# Patient Record
Sex: FEMALE | Race: WHITE | NOT HISPANIC OR LATINO | Employment: OTHER | URBAN - NONMETROPOLITAN AREA
[De-identification: names, ages, dates, MRNs, and addresses within clinical notes are randomized per-mention and may not be internally consistent; named-entity substitution may affect disease eponyms.]

---

## 2017-02-09 ENCOUNTER — TELEPHONE (OUTPATIENT)
Dept: FAMILY MEDICINE CLINIC | Facility: CLINIC | Age: 67
End: 2017-02-09

## 2017-02-09 RX ORDER — HYDROXYCHLOROQUINE SULFATE 200 MG/1
200 TABLET, FILM COATED ORAL DAILY
Qty: 90 TABLET | Refills: 1 | Status: SHIPPED | OUTPATIENT
Start: 2017-02-09 | End: 2017-07-10 | Stop reason: SDUPTHER

## 2017-02-09 RX ORDER — INDOMETHACIN 25 MG/1
25 CAPSULE ORAL 2 TIMES DAILY
Qty: 180 CAPSULE | Refills: 1 | Status: SHIPPED | OUTPATIENT
Start: 2017-02-09 | End: 2017-07-18 | Stop reason: SDUPTHER

## 2017-02-09 RX ORDER — VENLAFAXINE 75 MG/1
75 TABLET ORAL DAILY
Qty: 90 TABLET | Refills: 1 | Status: SHIPPED | OUTPATIENT
Start: 2017-02-09 | End: 2017-07-10 | Stop reason: SDUPTHER

## 2017-02-09 RX ORDER — SIMVASTATIN 20 MG
20 TABLET ORAL DAILY
Qty: 90 TABLET | Refills: 1 | Status: SHIPPED | OUTPATIENT
Start: 2017-02-09 | End: 2017-07-10 | Stop reason: SDUPTHER

## 2017-02-09 RX ORDER — LISINOPRIL 20 MG/1
20 TABLET ORAL DAILY
Qty: 90 TABLET | Refills: 1 | Status: SHIPPED | OUTPATIENT
Start: 2017-02-09 | End: 2017-07-10 | Stop reason: SDUPTHER

## 2017-07-10 ENCOUNTER — TELEPHONE (OUTPATIENT)
Dept: FAMILY MEDICINE CLINIC | Facility: CLINIC | Age: 67
End: 2017-07-10

## 2017-07-10 RX ORDER — LISINOPRIL 20 MG/1
20 TABLET ORAL DAILY
Qty: 30 TABLET | Refills: 0 | Status: SHIPPED | OUTPATIENT
Start: 2017-07-10 | End: 2017-07-18 | Stop reason: DRUGHIGH

## 2017-07-10 RX ORDER — SIMVASTATIN 20 MG
20 TABLET ORAL DAILY
Qty: 30 TABLET | Refills: 0 | Status: SHIPPED | OUTPATIENT
Start: 2017-07-10 | End: 2017-07-18 | Stop reason: SDUPTHER

## 2017-07-10 RX ORDER — VENLAFAXINE 75 MG/1
75 TABLET ORAL DAILY
Qty: 30 TABLET | Refills: 0 | Status: SHIPPED | OUTPATIENT
Start: 2017-07-10 | End: 2017-07-18 | Stop reason: SDUPTHER

## 2017-07-10 RX ORDER — HYDROXYCHLOROQUINE SULFATE 200 MG/1
200 TABLET, FILM COATED ORAL DAILY
Qty: 30 TABLET | Refills: 0 | Status: SHIPPED | OUTPATIENT
Start: 2017-07-10 | End: 2017-07-18

## 2017-07-10 NOTE — TELEPHONE ENCOUNTER
Patient needs refills.    Lisinopril 20 mg    Simvastatin 20 mg    Venlafaxine 75 mg    She has no RX insurance.  Hydroxychloroquine 200 mg is too expensive. Can you prescribe something cheaper?      [facility].

## 2017-07-18 ENCOUNTER — OFFICE VISIT (OUTPATIENT)
Dept: FAMILY MEDICINE CLINIC | Facility: CLINIC | Age: 67
End: 2017-07-18

## 2017-07-18 VITALS
DIASTOLIC BLOOD PRESSURE: 100 MMHG | SYSTOLIC BLOOD PRESSURE: 160 MMHG | OXYGEN SATURATION: 98 % | WEIGHT: 167 LBS | HEART RATE: 87 BPM | TEMPERATURE: 98.4 F | HEIGHT: 66 IN | BODY MASS INDEX: 26.84 KG/M2

## 2017-07-18 DIAGNOSIS — M06.9 RHEUMATOID ARTHRITIS INVOLVING BOTH HANDS, UNSPECIFIED RHEUMATOID FACTOR PRESENCE: ICD-10-CM

## 2017-07-18 DIAGNOSIS — G25.81 RESTLESS LEGS SYNDROME: ICD-10-CM

## 2017-07-18 DIAGNOSIS — I10 ESSENTIAL HYPERTENSION: Primary | ICD-10-CM

## 2017-07-18 PROCEDURE — 99214 OFFICE O/P EST MOD 30 MIN: CPT | Performed by: FAMILY MEDICINE

## 2017-07-18 PROCEDURE — 80048 BASIC METABOLIC PNL TOTAL CA: CPT | Performed by: FAMILY MEDICINE

## 2017-07-18 PROCEDURE — 36415 COLL VENOUS BLD VENIPUNCTURE: CPT | Performed by: FAMILY MEDICINE

## 2017-07-18 RX ORDER — VENLAFAXINE 75 MG/1
75 TABLET ORAL DAILY
Qty: 30 TABLET | Refills: 11 | Status: SHIPPED | OUTPATIENT
Start: 2017-07-18 | End: 2018-07-08 | Stop reason: ALTCHOICE

## 2017-07-18 RX ORDER — FERROUS SULFATE 325(65) MG
325 TABLET ORAL 2 TIMES DAILY
Qty: 100 TABLET | Refills: 11 | Status: SHIPPED | OUTPATIENT
Start: 2017-07-18 | End: 2022-05-05

## 2017-07-18 RX ORDER — IBUPROFEN 800 MG/1
TABLET ORAL
COMMUNITY
Start: 2015-07-15 | End: 2017-07-18

## 2017-07-18 RX ORDER — LISINOPRIL 40 MG/1
40 TABLET ORAL DAILY
Qty: 30 TABLET | Refills: 0 | Status: SHIPPED | OUTPATIENT
Start: 2017-07-18 | End: 2017-08-02 | Stop reason: SDUPTHER

## 2017-07-18 RX ORDER — HYDROXYCHLOROQUINE SULFATE 200 MG/1
200 TABLET, FILM COATED ORAL DAILY
Qty: 90 TABLET | Refills: 3 | Status: SHIPPED | OUTPATIENT
Start: 2017-07-18 | End: 2018-07-31 | Stop reason: SDUPTHER

## 2017-07-18 RX ORDER — SIMVASTATIN 20 MG
20 TABLET ORAL DAILY
Qty: 30 TABLET | Refills: 11 | Status: SHIPPED | OUTPATIENT
Start: 2017-07-18 | End: 2018-09-09 | Stop reason: SDUPTHER

## 2017-07-18 RX ORDER — INDOMETHACIN 25 MG/1
25 CAPSULE ORAL 2 TIMES DAILY
Qty: 180 CAPSULE | Refills: 1 | Status: SHIPPED | OUTPATIENT
Start: 2017-07-18 | End: 2019-08-27

## 2017-07-18 NOTE — PROGRESS NOTES
Giselle Herrera is a 66 y.o. female.     History of Present Illness     bp rechecked and still high.  Stopped plaquenil.  Over $100 monthly.  Still jumpy legs.  Ferritin was 31  Has taken effexor for years    Review of Systems   Constitutional: Negative for chills, fatigue and fever.   HENT: Negative for congestion, ear discharge, ear pain, facial swelling, hearing loss, postnasal drip, rhinorrhea, sinus pressure, sore throat, trouble swallowing and voice change.    Eyes: Negative for discharge, redness and visual disturbance.   Respiratory: Negative for cough, chest tightness, shortness of breath and wheezing.    Cardiovascular: Negative for chest pain and palpitations.   Gastrointestinal: Negative for abdominal pain, blood in stool, constipation, diarrhea, nausea and vomiting.   Endocrine: Negative for polydipsia and polyuria.   Genitourinary: Negative for dysuria, flank pain, hematuria and urgency.   Musculoskeletal: Negative for arthralgias, back pain, joint swelling and myalgias.   Skin: Negative for rash.   Neurological: Negative for dizziness, weakness, numbness and headaches.   Hematological: Negative for adenopathy.   Psychiatric/Behavioral: Negative for confusion and sleep disturbance. The patient is not nervous/anxious.        Objective   Physical Exam   Constitutional: She is oriented to person, place, and time. She appears well-developed and well-nourished.   HENT:   Head: Normocephalic and atraumatic.   Right Ear: External ear normal.   Left Ear: External ear normal.   Nose: Nose normal.   Eyes: Conjunctivae and EOM are normal. Pupils are equal, round, and reactive to light.   Neck: Normal range of motion. Neck supple.   Cardiovascular: Normal rate, regular rhythm and normal heart sounds.  Exam reveals no gallop and no friction rub.    No murmur heard.  Pulmonary/Chest: Effort normal and breath sounds normal.   Abdominal: Soft. Bowel sounds are normal. She exhibits no distension. There  is no tenderness. There is no rebound and no guarding.   Musculoskeletal: Normal range of motion. She exhibits no edema or deformity.   Neurological: She is alert and oriented to person, place, and time. No cranial nerve deficit.   Skin: Skin is warm and dry. No rash noted. No erythema.   Psychiatric: She has a normal mood and affect. Her behavior is normal. Judgment and thought content normal.   Nursing note and vitals reviewed.      Assessment/Plan   Lindsay was seen today for follow-up and med refill.    Diagnoses and all orders for this visit:    Essential hypertension  -     Basic Metabolic Panel    Rheumatoid arthritis involving both hands, unspecified rheumatoid factor presence    Restless legs syndrome    Other orders  -     hydroxychloroquine (PLAQUENIL) 200 MG tablet; Take 1 tablet by mouth Daily.  -     lisinopril (PRINIVIL,ZESTRIL) 40 MG tablet; Take 1 tablet by mouth Daily.  -     venlafaxine (EFFEXOR) 75 MG tablet; Take 1 tablet by mouth Daily. For acute reaction to stress.  -     simvastatin (ZOCOR) 20 MG tablet; Take 1 tablet by mouth Daily. For cholesterol  -     indomethacin (INDOCIN) 25 MG capsule; Take 1 capsule by mouth 2 (Two) Times a Day. For arthritis  -     ferrous sulfate 325 (65 FE) MG tablet; Take 1 tablet by mouth 2 (Two) Times a Day.      Refilled meds  May try calling other pharmacies for prices on plaquenil.  May try [facility]  Increase lisinopril to 40mg qd  Return 2 weeks bp check  Avoid diuretics since was on allopurinol at one time, she doesn't believe ever told she had gout.  Check renal function today    Oral iron bid may help restless legs

## 2017-07-19 LAB
ANION GAP SERPL CALCULATED.3IONS-SCNC: 13 MMOL/L (ref 5–15)
BUN BLD-MCNC: 11 MG/DL (ref 7–21)
BUN/CREAT SERPL: 14.3 (ref 7–25)
CALCIUM SPEC-SCNC: 9.7 MG/DL (ref 8.4–10.2)
CHLORIDE SERPL-SCNC: 104 MMOL/L (ref 95–110)
CO2 SERPL-SCNC: 24 MMOL/L (ref 22–31)
CREAT BLD-MCNC: 0.77 MG/DL (ref 0.5–1)
GFR SERPL CREATININE-BSD FRML MDRD: 75 ML/MIN/1.73 (ref 60–104)
GLUCOSE BLD-MCNC: 79 MG/DL (ref 60–100)
POTASSIUM BLD-SCNC: 4.9 MMOL/L (ref 3.5–5.1)
SODIUM BLD-SCNC: 141 MMOL/L (ref 137–145)

## 2017-08-02 ENCOUNTER — OFFICE VISIT (OUTPATIENT)
Dept: FAMILY MEDICINE CLINIC | Facility: CLINIC | Age: 67
End: 2017-08-02

## 2017-08-02 VITALS
HEIGHT: 66 IN | SYSTOLIC BLOOD PRESSURE: 148 MMHG | WEIGHT: 165.6 LBS | OXYGEN SATURATION: 98 % | TEMPERATURE: 99.9 F | BODY MASS INDEX: 26.61 KG/M2 | DIASTOLIC BLOOD PRESSURE: 80 MMHG | HEART RATE: 79 BPM

## 2017-08-02 DIAGNOSIS — I10 ESSENTIAL HYPERTENSION: Primary | ICD-10-CM

## 2017-08-02 PROCEDURE — 99213 OFFICE O/P EST LOW 20 MIN: CPT | Performed by: FAMILY MEDICINE

## 2017-08-02 RX ORDER — LISINOPRIL 40 MG/1
60 TABLET ORAL DAILY
Qty: 45 TABLET | Refills: 0 | Status: SHIPPED | OUTPATIENT
Start: 2017-08-02 | End: 2017-08-22 | Stop reason: SDUPTHER

## 2017-08-02 NOTE — PROGRESS NOTES
Subjective   Lindsay Herrera is a 66 y.o. female.     History of Present Illness     Getting plaquenil from [place]   $100 cheaper  bp much better 40mg lisinopril qd.    Review of Systems   Constitutional: Negative for chills, fatigue and fever.   HENT: Negative for congestion, ear discharge, ear pain, facial swelling, hearing loss, postnasal drip, rhinorrhea, sinus pressure, sore throat, trouble swallowing and voice change.    Eyes: Negative for discharge, redness and visual disturbance.   Respiratory: Negative for cough, chest tightness, shortness of breath and wheezing.    Cardiovascular: Negative for chest pain and palpitations.   Gastrointestinal: Negative for abdominal pain, blood in stool, constipation, diarrhea, nausea and vomiting.   Endocrine: Negative for polydipsia and polyuria.   Genitourinary: Negative for dysuria, flank pain, hematuria and urgency.   Musculoskeletal: Negative for arthralgias, back pain, joint swelling and myalgias.   Skin: Negative for rash.   Neurological: Negative for dizziness, weakness, numbness and headaches.   Hematological: Negative for adenopathy.   Psychiatric/Behavioral: Negative for confusion and sleep disturbance. The patient is not nervous/anxious.        Objective   Physical Exam   Constitutional: She is oriented to person, place, and time. She appears well-developed and well-nourished.   HENT:   Head: Normocephalic and atraumatic.   Right Ear: External ear normal.   Left Ear: External ear normal.   Nose: Nose normal.   Eyes: Conjunctivae and EOM are normal. Pupils are equal, round, and reactive to light.   Neck: Normal range of motion.   Pulmonary/Chest: Effort normal.   Musculoskeletal: Normal range of motion.   Neurological: She is alert and oriented to person, place, and time.   Psychiatric: She has a normal mood and affect. Her behavior is normal. Judgment and thought content normal.   Nursing note and vitals reviewed.      Assessment/Plan   Lindsay was seen today  for follow-up.    Diagnoses and all orders for this visit:    Essential hypertension    Other orders  -     lisinopril (PRINIVIL,ZESTRIL) 40 MG tablet; Take 1.5 tablets by mouth Daily.      She will take 40mg am and 20mg pm.   2 wks bp check

## 2017-08-22 ENCOUNTER — TELEPHONE (OUTPATIENT)
Dept: FAMILY MEDICINE CLINIC | Facility: CLINIC | Age: 67
End: 2017-08-22

## 2017-08-22 RX ORDER — LISINOPRIL 40 MG/1
60 TABLET ORAL DAILY
Qty: 45 TABLET | Refills: 11 | Status: SHIPPED | OUTPATIENT
Start: 2017-08-22 | End: 2017-09-21

## 2017-08-22 NOTE — TELEPHONE ENCOUNTER
PATIENT CAME IN FOR REPEAT BLOOD PRESSURE CHECK. BLOOD PRESSURE /70. ALSO NEEDS A REFILL OF HER LISINOPRIL 60MG 1 DAILY #90 TO [facility]

## 2017-09-21 ENCOUNTER — TELEPHONE (OUTPATIENT)
Dept: FAMILY MEDICINE CLINIC | Facility: CLINIC | Age: 67
End: 2017-09-21

## 2017-09-21 ENCOUNTER — OFFICE VISIT (OUTPATIENT)
Dept: FAMILY MEDICINE CLINIC | Facility: CLINIC | Age: 67
End: 2017-09-21

## 2017-09-21 VITALS
HEIGHT: 66 IN | WEIGHT: 166.8 LBS | OXYGEN SATURATION: 98 % | DIASTOLIC BLOOD PRESSURE: 102 MMHG | SYSTOLIC BLOOD PRESSURE: 154 MMHG | BODY MASS INDEX: 26.81 KG/M2 | HEART RATE: 78 BPM | TEMPERATURE: 97.9 F

## 2017-09-21 DIAGNOSIS — I10 ESSENTIAL HYPERTENSION: Primary | ICD-10-CM

## 2017-09-21 DIAGNOSIS — M06.9 RHEUMATOID ARTHRITIS INVOLVING BOTH HANDS, UNSPECIFIED RHEUMATOID FACTOR PRESENCE: ICD-10-CM

## 2017-09-21 PROCEDURE — 99213 OFFICE O/P EST LOW 20 MIN: CPT | Performed by: FAMILY MEDICINE

## 2017-09-21 RX ORDER — AMLODIPINE BESYLATE 5 MG/1
5 TABLET ORAL DAILY
Qty: 30 TABLET | Refills: 0 | Status: SHIPPED | OUTPATIENT
Start: 2017-09-21 | End: 2017-10-16 | Stop reason: SDUPTHER

## 2017-09-21 RX ORDER — LISINOPRIL 40 MG/1
40 TABLET ORAL DAILY
Qty: 30 TABLET | Refills: 11 | Status: SHIPPED | OUTPATIENT
Start: 2017-09-21 | End: 2018-07-03 | Stop reason: ALTCHOICE

## 2017-09-21 NOTE — TELEPHONE ENCOUNTER
----- Message from Georgie Billings MA sent at 9/21/2017 11:59 AM CDT -----  Regarding: FW: Non-Urgent Medical Question  Contact: [phone]      ----- Message -----     From: Lindsay Herrera     Sent: 9/21/2017  10:49 AM       To: Desire Tran [facility]  Subject: Non-Urgent Medical Question                      i been on 60mg blood pressure  tuesday morning it was 142/94  that nite 184/96  wed morn 147/68  wed nite 161/79  you think i should which to the 100 mg you was talking about?

## 2017-09-21 NOTE — PROGRESS NOTES
Subjective   Lindsay Herrera is a 66 y.o. female.     History of Present Illness     Has not been taking her meds with food.  Upsetting stomach  [place]  bp not under good control.  Taking lisinopril 40mg am and 20mg pm.  Her blood pressure cuff is not accurate, she brought it today to check.    Review of Systems   Constitutional: Negative for chills, fatigue and fever.   HENT: Negative for congestion, ear discharge, ear pain, facial swelling, hearing loss, postnasal drip, rhinorrhea, sinus pressure, sore throat, trouble swallowing and voice change.    Eyes: Negative for discharge, redness and visual disturbance.   Respiratory: Negative for cough, chest tightness, shortness of breath and wheezing.    Cardiovascular: Negative for chest pain and palpitations.   Gastrointestinal: Negative for abdominal pain, blood in stool, constipation, diarrhea, nausea and vomiting.   Endocrine: Negative for polydipsia and polyuria.   Genitourinary: Negative for dysuria, flank pain, hematuria and urgency.   Musculoskeletal: Negative for arthralgias, back pain, joint swelling and myalgias.   Skin: Negative for rash.   Neurological: Negative for dizziness, weakness, numbness and headaches.   Hematological: Negative for adenopathy.   Psychiatric/Behavioral: Negative for confusion and sleep disturbance. The patient is not nervous/anxious.        Objective   Physical Exam   Constitutional: She is oriented to person, place, and time. She appears well-developed and well-nourished.   HENT:   Head: Normocephalic and atraumatic.   Right Ear: External ear normal.   Left Ear: External ear normal.   Nose: Nose normal.   Eyes: Conjunctivae and EOM are normal. Pupils are equal, round, and reactive to light.   Neck: Normal range of motion.   Pulmonary/Chest: Effort normal.   Musculoskeletal: Normal range of motion.   Neurological: She is alert and oriented to person, place, and time.   Psychiatric:  She has a normal mood and affect. Her behavior is normal. Judgment and thought content normal.   Nursing note and vitals reviewed.      Assessment/Plan   Lindsay was seen today for hypertension.    Diagnoses and all orders for this visit:    Essential hypertension    Rheumatoid arthritis involving both hands, unspecified rheumatoid factor presence    Other orders  -     lisinopril (PRINIVIL,ZESTRIL) 40 MG tablet; Take 1 tablet by mouth Daily.  -     amLODIPine (NORVASC) 5 MG tablet; Take 1 tablet by mouth Daily.    go back to 40mg qd lisinopril  Add norvasc 5mg  Recheck bp 1-2 weeks, will need another bp monitor for home  Take meds with food!

## 2017-10-16 RX ORDER — AMLODIPINE BESYLATE 5 MG/1
5 TABLET ORAL DAILY
Qty: 30 TABLET | Refills: 0 | Status: SHIPPED | OUTPATIENT
Start: 2017-10-16 | End: 2017-10-18 | Stop reason: SDUPTHER

## 2017-10-17 ENCOUNTER — CLINICAL SUPPORT (OUTPATIENT)
Dept: FAMILY MEDICINE CLINIC | Facility: CLINIC | Age: 67
End: 2017-10-17

## 2017-10-17 VITALS — SYSTOLIC BLOOD PRESSURE: 124 MMHG | DIASTOLIC BLOOD PRESSURE: 70 MMHG

## 2017-10-17 PROCEDURE — 90686 IIV4 VACC NO PRSV 0.5 ML IM: CPT | Performed by: FAMILY MEDICINE

## 2017-10-17 PROCEDURE — G0008 ADMIN INFLUENZA VIRUS VAC: HCPCS | Performed by: FAMILY MEDICINE

## 2017-10-18 RX ORDER — AMLODIPINE BESYLATE 5 MG/1
5 TABLET ORAL DAILY
Qty: 90 TABLET | Refills: 3 | Status: SHIPPED | OUTPATIENT
Start: 2017-10-18 | End: 2018-07-03 | Stop reason: ALTCHOICE

## 2018-07-03 ENCOUNTER — OFFICE VISIT (OUTPATIENT)
Dept: FAMILY MEDICINE CLINIC | Facility: CLINIC | Age: 68
End: 2018-07-03

## 2018-07-03 VITALS
HEIGHT: 66 IN | BODY MASS INDEX: 25.04 KG/M2 | OXYGEN SATURATION: 97 % | TEMPERATURE: 99.6 F | WEIGHT: 155.8 LBS | SYSTOLIC BLOOD PRESSURE: 150 MMHG | DIASTOLIC BLOOD PRESSURE: 98 MMHG | HEART RATE: 74 BPM

## 2018-07-03 DIAGNOSIS — F32.A DEPRESSION, UNSPECIFIED DEPRESSION TYPE: ICD-10-CM

## 2018-07-03 DIAGNOSIS — I10 ESSENTIAL HYPERTENSION: Primary | ICD-10-CM

## 2018-07-03 DIAGNOSIS — F41.9 ANXIETY: ICD-10-CM

## 2018-07-03 PROCEDURE — 99214 OFFICE O/P EST MOD 30 MIN: CPT | Performed by: FAMILY MEDICINE

## 2018-07-03 RX ORDER — VERAPAMIL HYDROCHLORIDE 80 MG/1
80 TABLET ORAL 3 TIMES DAILY
Qty: 90 TABLET | Refills: 0 | Status: SHIPPED | OUTPATIENT
Start: 2018-07-03 | End: 2018-07-23 | Stop reason: SDUPTHER

## 2018-07-03 RX ORDER — BENAZEPRIL HYDROCHLORIDE 40 MG/1
40 TABLET, FILM COATED ORAL DAILY
Qty: 30 TABLET | Refills: 0 | Status: SHIPPED | OUTPATIENT
Start: 2018-07-03 | End: 2018-07-23 | Stop reason: SDUPTHER

## 2018-07-03 NOTE — PROGRESS NOTES
" Giselle Herrera is a 67 y.o. female.     History of Present Illness     Felt bad on norvasc and effexor.  She stopped antidepressant and is taking the norvasc every other day because expensive ($60)  She is crying for no reason and bp not controlled.   Wants me to prescribe from $4 list at [facility]    Review of Systems   Constitutional: Negative for chills, fatigue and fever.   HENT: Negative for congestion, ear discharge, ear pain, facial swelling, hearing loss, postnasal drip, rhinorrhea, sinus pressure, sore throat, trouble swallowing and voice change.    Eyes: Negative for discharge, redness and visual disturbance.   Respiratory: Negative for cough, chest tightness, shortness of breath and wheezing.    Cardiovascular: Negative for chest pain and palpitations.   Gastrointestinal: Negative for abdominal pain, blood in stool, constipation, diarrhea, nausea and vomiting.   Endocrine: Negative for polydipsia and polyuria.   Genitourinary: Negative for dysuria, flank pain, hematuria and urgency.   Musculoskeletal: Negative for arthralgias, back pain, joint swelling and myalgias.   Skin: Negative for rash.   Neurological: Negative for dizziness, weakness, numbness and headaches.   Hematological: Negative for adenopathy.   Psychiatric/Behavioral: Positive for dysphoric mood. Negative for confusion and sleep disturbance. The patient is not nervous/anxious.            /98 (BP Location: Left arm, Patient Position: Sitting, Cuff Size: Adult)   Pulse 74   Temp 99.6 °F (37.6 °C) (Temporal Artery )   Ht 167.6 cm (65.98\")   Wt 70.7 kg (155 lb 12.8 oz)   SpO2 97%   BMI 25.16 kg/m²       Objective     Physical Exam   Constitutional: She is oriented to person, place, and time. She appears well-developed and well-nourished.   HENT:   Head: Normocephalic and atraumatic.   Right Ear: External ear normal.   Left Ear: External ear normal.   Nose: Nose normal.   Eyes: Conjunctivae and EOM are normal. Pupils are " equal, round, and reactive to light.   Neck: Normal range of motion. Neck supple.   Cardiovascular: Normal rate, regular rhythm and normal heart sounds.  Exam reveals no gallop and no friction rub.    No murmur heard.  Pulmonary/Chest: Effort normal and breath sounds normal.   Abdominal: She exhibits no distension. There is no tenderness. There is no rebound and no guarding.   Musculoskeletal: Normal range of motion. She exhibits no edema or deformity.   Neurological: She is alert and oriented to person, place, and time. No cranial nerve deficit.   Skin: Skin is warm and dry. No rash noted. No erythema.   Psychiatric: She has a normal mood and affect. Her behavior is normal. Judgment and thought content normal.   Nursing note and vitals reviewed.          PAST MEDICAL HISTORY     Past Medical History:   Diagnosis Date   • Artificial lens present     Artificial lens in position - both     • Nuclear senile cataract of left eye    • Visual discomfort     Visual discomfort - R>L       PAST SURGICAL HISTORY     Past Surgical History:   Procedure Laterality Date   • CATARACT EXTRACTION Left 07/28/2015    Remove cataract, insert lens (2) - left eye   • OTHER SURGICAL HISTORY  07/21/2015    OPTICAL BIOMETRY 39129 (2) - AMARILIS Dubon      SOCIAL HISTORY     Social History     Social History   • Marital status:      Social History Main Topics   • Smoking status: Former Smoker     Types: Cigarettes     Quit date: 11/22/2013   • Smokeless tobacco: Never Used      Comment: Current Smoker   • Alcohol use No   • Drug use: No   • Sexual activity: Not Currently     Other Topics Concern   • Not on file      ALLERGIES   Patient has no known allergies.   MEDICATIONS     Current Outpatient Prescriptions   Medication Sig Dispense Refill   • ferrous sulfate 325 (65 FE) MG tablet Take 1 tablet by mouth 2 (Two) Times a Day. 100 tablet 11   • hydroxychloroquine (PLAQUENIL) 200 MG tablet Take 1 tablet by mouth Daily. 90 tablet 3   •  indomethacin (INDOCIN) 25 MG capsule Take 1 capsule by mouth 2 (Two) Times a Day. For arthritis 180 capsule 1   • simvastatin (ZOCOR) 20 MG tablet Take 1 tablet by mouth Daily. For cholesterol 30 tablet 11   • benazepril (LOTENSIN) 40 MG tablet Take 1 tablet by mouth Daily. 30 tablet 0   • venlafaxine (EFFEXOR) 75 MG tablet Take 1 tablet by mouth Daily. For acute reaction to stress. 30 tablet 11   • verapamil (CALAN) 80 MG tablet Take 1 tablet by mouth 3 (Three) Times a Day. 90 tablet 0     No current facility-administered medications for this visit.         The following portions of the patient's history were reviewed and updated as appropriate: allergies, current medications, past family history, past medical history, past social history, past surgical history and problem list.        Assessment/Plan   Lindsay was seen today for depression.    Diagnoses and all orders for this visit:    Essential hypertension    Anxiety    Depression, unspecified depression type    Other orders  -     benazepril (LOTENSIN) 40 MG tablet; Take 1 tablet by mouth Daily.  -     verapamil (CALAN) 80 MG tablet; Take 1 tablet by mouth 3 (Three) Times a Day.      Changed lisinopril to benazepril on $4 list.  Changed norvasc to verapamil 80mg tid $4 list  Resume effexor 75mg qam, has worked in past for her   Return after 7/18/17 so I can get labs and will be covered by medicare                 No Follow-up on file.                  This document has been electronically signed by Bill Herrera MD on July 3, 2018 3:15 PM

## 2018-07-08 RX ORDER — FLUOXETINE HYDROCHLORIDE 20 MG/1
20 CAPSULE ORAL DAILY
Qty: 90 CAPSULE | Refills: 3 | Status: SHIPPED | OUTPATIENT
Start: 2018-07-08 | End: 2019-03-08 | Stop reason: SDUPTHER

## 2018-07-23 ENCOUNTER — TELEPHONE (OUTPATIENT)
Dept: FAMILY MEDICINE CLINIC | Facility: CLINIC | Age: 68
End: 2018-07-23

## 2018-07-23 ENCOUNTER — LAB (OUTPATIENT)
Dept: LAB | Facility: CLINIC | Age: 68
End: 2018-07-23

## 2018-07-23 DIAGNOSIS — I10 ESSENTIAL HYPERTENSION: ICD-10-CM

## 2018-07-23 DIAGNOSIS — I10 ESSENTIAL HYPERTENSION: Primary | ICD-10-CM

## 2018-07-23 PROCEDURE — 80061 LIPID PANEL: CPT | Performed by: FAMILY MEDICINE

## 2018-07-23 PROCEDURE — 85027 COMPLETE CBC AUTOMATED: CPT | Performed by: FAMILY MEDICINE

## 2018-07-23 PROCEDURE — 84443 ASSAY THYROID STIM HORMONE: CPT | Performed by: FAMILY MEDICINE

## 2018-07-23 PROCEDURE — 36415 COLL VENOUS BLD VENIPUNCTURE: CPT | Performed by: FAMILY MEDICINE

## 2018-07-23 PROCEDURE — 80053 COMPREHEN METABOLIC PANEL: CPT | Performed by: FAMILY MEDICINE

## 2018-07-23 RX ORDER — BENAZEPRIL HYDROCHLORIDE 40 MG/1
40 TABLET, FILM COATED ORAL DAILY
Qty: 30 TABLET | Refills: 11 | Status: SHIPPED | OUTPATIENT
Start: 2018-07-23 | End: 2019-08-22 | Stop reason: SDUPTHER

## 2018-07-23 RX ORDER — VERAPAMIL HYDROCHLORIDE 80 MG/1
80 TABLET ORAL 3 TIMES DAILY
Qty: 90 TABLET | Refills: 11 | Status: SHIPPED | OUTPATIENT
Start: 2018-07-23 | End: 2019-08-22 | Stop reason: SDUPTHER

## 2018-07-23 RX ORDER — HYDROCHLOROTHIAZIDE 12.5 MG/1
12.5 CAPSULE, GELATIN COATED ORAL DAILY
Qty: 30 CAPSULE | Refills: 11 | Status: SHIPPED | OUTPATIENT
Start: 2018-07-23 | End: 2019-08-22 | Stop reason: SDUPTHER

## 2018-07-23 NOTE — TELEPHONE ENCOUNTER
Patient came in for a bp check-- her bp was 142/86. 69 heart rate. Dr Herrera called her some HCTZ 12.  5 mg 1 daily to [facility]

## 2018-07-24 LAB
ALBUMIN SERPL-MCNC: 4.3 G/DL (ref 3.4–4.8)
ALBUMIN/GLOB SERPL: 1.8 G/DL (ref 1.1–1.8)
ALP SERPL-CCNC: 59 U/L (ref 38–126)
ALT SERPL W P-5'-P-CCNC: 23 U/L (ref 9–52)
ANION GAP SERPL CALCULATED.3IONS-SCNC: 9 MMOL/L (ref 5–15)
ARTICHOKE IGE QN: 84 MG/DL (ref 1–129)
AST SERPL-CCNC: 17 U/L (ref 14–36)
BILIRUB SERPL-MCNC: 0.8 MG/DL (ref 0.2–1.3)
BUN BLD-MCNC: 9 MG/DL (ref 7–21)
BUN/CREAT SERPL: 10 (ref 7–25)
CALCIUM SPEC-SCNC: 9.4 MG/DL (ref 8.4–10.2)
CHLORIDE SERPL-SCNC: 108 MMOL/L (ref 95–110)
CHOLEST SERPL-MCNC: 170 MG/DL (ref 0–199)
CO2 SERPL-SCNC: 23 MMOL/L (ref 22–31)
CREAT BLD-MCNC: 0.9 MG/DL (ref 0.5–1)
DEPRECATED RDW RBC AUTO: 45 FL (ref 36.4–46.3)
ERYTHROCYTE [DISTWIDTH] IN BLOOD BY AUTOMATED COUNT: 13.3 % (ref 11.5–14.5)
GFR SERPL CREATININE-BSD FRML MDRD: 63 ML/MIN/1.73 (ref 45–104)
GLOBULIN UR ELPH-MCNC: 2.4 GM/DL (ref 2.3–3.5)
GLUCOSE BLD-MCNC: 101 MG/DL (ref 60–100)
HCT VFR BLD AUTO: 40.6 % (ref 35–45)
HDLC SERPL-MCNC: 53 MG/DL (ref 60–200)
HGB BLD-MCNC: 13.5 G/DL (ref 12–15.5)
LDLC/HDLC SERPL: 1.73 {RATIO} (ref 0–3.22)
MCH RBC QN AUTO: 30.8 PG (ref 26.5–34)
MCHC RBC AUTO-ENTMCNC: 33.3 G/DL (ref 31.4–36)
MCV RBC AUTO: 92.5 FL (ref 80–98)
PLATELET # BLD AUTO: 192 10*3/MM3 (ref 150–450)
PMV BLD AUTO: 13.7 FL (ref 8–12)
POTASSIUM BLD-SCNC: 4.3 MMOL/L (ref 3.5–5.1)
PROT SERPL-MCNC: 6.7 G/DL (ref 6.3–8.6)
RBC # BLD AUTO: 4.39 10*6/MM3 (ref 3.77–5.16)
SODIUM BLD-SCNC: 140 MMOL/L (ref 137–145)
TRIGL SERPL-MCNC: 126 MG/DL (ref 20–199)
TSH SERPL DL<=0.05 MIU/L-ACNC: 1.71 MIU/ML (ref 0.46–4.68)
WBC NRBC COR # BLD: 3.96 10*3/MM3 (ref 3.2–9.8)

## 2018-07-31 ENCOUNTER — TELEPHONE (OUTPATIENT)
Dept: FAMILY MEDICINE CLINIC | Facility: CLINIC | Age: 68
End: 2018-07-31

## 2018-07-31 RX ORDER — HYDROXYCHLOROQUINE SULFATE 200 MG/1
200 TABLET, FILM COATED ORAL DAILY
Qty: 100 TABLET | Refills: 3 | Status: SHIPPED | OUTPATIENT
Start: 2018-07-31 | End: 2019-08-22 | Stop reason: SDUPTHER

## 2018-07-31 NOTE — TELEPHONE ENCOUNTER
----- Message from Georgie Billings MA sent at 2018 11:22 AM CDT -----  Regarding: FW: Prescription Question  Contact: [phone]      ----- Message -----  From: Lindsay Herrera  Sent: 2018  11:04 AM  To: Desire Tran [facility]  Subject: Prescription Question                            my  hydroxychloroquine 200 MG tablet  Commonly known as: PLAQUENIL  has   if you can fax a new perscription to   its the [facility] pharmacy in [place] and i get 100 not 90 thats how they send them ty

## 2018-09-10 RX ORDER — SIMVASTATIN 20 MG
TABLET ORAL
Qty: 30 TABLET | Refills: 11 | Status: SHIPPED | OUTPATIENT
Start: 2018-09-10 | End: 2019-08-22 | Stop reason: SDUPTHER

## 2018-10-30 ENCOUNTER — FLU SHOT (OUTPATIENT)
Dept: FAMILY MEDICINE CLINIC | Facility: CLINIC | Age: 68
End: 2018-10-30

## 2018-10-30 DIAGNOSIS — Z23 FLU VACCINE NEED: ICD-10-CM

## 2018-10-30 PROCEDURE — G0008 ADMIN INFLUENZA VIRUS VAC: HCPCS | Performed by: FAMILY MEDICINE

## 2018-10-30 PROCEDURE — 90674 CCIIV4 VAC NO PRSV 0.5 ML IM: CPT | Performed by: FAMILY MEDICINE

## 2019-03-09 RX ORDER — FLUOXETINE HYDROCHLORIDE 20 MG/1
CAPSULE ORAL
Qty: 90 CAPSULE | Refills: 3 | Status: SHIPPED | OUTPATIENT
Start: 2019-03-09 | End: 2019-08-22 | Stop reason: SDUPTHER

## 2019-04-01 ENCOUNTER — OFFICE VISIT (OUTPATIENT)
Dept: FAMILY MEDICINE CLINIC | Facility: CLINIC | Age: 69
End: 2019-04-01

## 2019-04-01 VITALS
DIASTOLIC BLOOD PRESSURE: 100 MMHG | WEIGHT: 170.2 LBS | OXYGEN SATURATION: 97 % | HEART RATE: 97 BPM | BODY MASS INDEX: 27.35 KG/M2 | TEMPERATURE: 98.6 F | SYSTOLIC BLOOD PRESSURE: 160 MMHG | HEIGHT: 66 IN

## 2019-04-01 DIAGNOSIS — R22.0 BUCCAL MASS: Primary | ICD-10-CM

## 2019-04-01 PROCEDURE — 99213 OFFICE O/P EST LOW 20 MIN: CPT | Performed by: NURSE PRACTITIONER

## 2019-04-01 NOTE — PROGRESS NOTES
Giselle Herrera is a 68 y.o. female. Bump inside of cheek.    History of Present Illness Noticed bump inside of left cheek on Thursday. Is worried because she feels it came about abruptly. Denies any pain, tingling, burning over bump. Denies noticing any drainage from bump. Claims bump has stayed about the same size since Thursday. Says she thought it may be from biting the inside of her mouth because she doesn't remember doing that. Admits to chewing gum a lot recently without wearing her dentures. Feels that her dentures fit properly. Denies cigarette smoking or chewing tobacco.     The following portions of the patient's history were reviewed and updated as appropriate: allergies, current medications, past family history, past medical history, past social history, past surgical history and problem list.    Review of Systems   Constitutional: Negative for appetite change, chills, fatigue and fever.   HENT: Negative for dental problem, drooling, sore throat and trouble swallowing.    Respiratory: Negative.    Cardiovascular: Negative.    Skin: Positive for skin lesions (buccal mass (left side)).   Hematological: Negative.        Objective   Physical Exam   Constitutional: She is oriented to person, place, and time. She appears well-developed and well-nourished. No distress.   Asked about BP, she has not taken her BP meds today. Plans to take them when she gets home.   HENT:   Head: Normocephalic.   Mouth/Throat: Uvula is midline, oropharynx is clear and moist and mucous membranes are normal. She has dentures. Oral lesions present. Tonsils are 1+ on the right. Tonsils are 1+ on the left. No tonsillar exudate.   Buccal mass: pink in color, round, around 1cm in diameter. Mass is intact, no swelling, erythema, or drainage noted. Feels soft to touch, no pain upon palpation of mass.    Eyes: Conjunctivae, EOM and lids are normal. Pupils are equal, round, and reactive to light.   Neck: Normal range of  motion. Neck supple. No thyromegaly present.   Cardiovascular: Normal rate and regular rhythm.   Pulmonary/Chest: Effort normal and breath sounds normal.   Lymphadenopathy:     She has no cervical adenopathy.   Neurological: She is alert and oriented to person, place, and time.   Skin: Skin is warm, dry and intact. She is not diaphoretic.   Psychiatric: She has a normal mood and affect. Her speech is normal and behavior is normal. Judgment and thought content normal. Cognition and memory are normal.         Assessment/Plan   Lindsay was seen today for mouth lesions.    Diagnoses and all orders for this visit:    Buccal mass  -     Ambulatory Referral to ENT (Otolaryngology)      Buccal mass about 1cm in diameter. Appears that it is probably from trauma but due to size felt it needed further evaluation with ENT. Discussed that the mass may be trauma related from biting the inside of her cheek, if it goes away she doesn't have to see ENT. If symptoms do not improve or worsen, patient instructed to return to the clinic for further evaluation.         This document has been electronically signed by MARANDA Dailey on  April 1, 2019 2:09 PM

## 2019-08-22 RX ORDER — FLUOXETINE HYDROCHLORIDE 20 MG/1
20 CAPSULE ORAL DAILY
Qty: 30 CAPSULE | Refills: 0 | Status: SHIPPED | OUTPATIENT
Start: 2019-08-22

## 2019-08-22 RX ORDER — HYDROCHLOROTHIAZIDE 12.5 MG/1
12.5 CAPSULE, GELATIN COATED ORAL DAILY
Qty: 30 CAPSULE | Refills: 0 | Status: SHIPPED | OUTPATIENT
Start: 2019-08-22 | End: 2019-08-27 | Stop reason: SINTOL

## 2019-08-22 RX ORDER — HYDROXYCHLOROQUINE SULFATE 200 MG/1
200 TABLET, FILM COATED ORAL DAILY
Qty: 100 TABLET | Refills: 0 | Status: SHIPPED | OUTPATIENT
Start: 2019-08-22 | End: 2019-08-27

## 2019-08-22 RX ORDER — SIMVASTATIN 20 MG
20 TABLET ORAL NIGHTLY
Qty: 30 TABLET | Refills: 0 | Status: SHIPPED | OUTPATIENT
Start: 2019-08-22 | End: 2019-11-28 | Stop reason: SDUPTHER

## 2019-08-22 RX ORDER — VERAPAMIL HYDROCHLORIDE 80 MG/1
80 TABLET ORAL 3 TIMES DAILY
Qty: 90 TABLET | Refills: 0 | Status: SHIPPED | OUTPATIENT
Start: 2019-08-22 | End: 2019-08-27 | Stop reason: ALTCHOICE

## 2019-08-22 RX ORDER — BENAZEPRIL HYDROCHLORIDE 40 MG/1
40 TABLET, FILM COATED ORAL DAILY
Qty: 30 TABLET | Refills: 0 | Status: SHIPPED | OUTPATIENT
Start: 2019-08-22 | End: 2019-09-18 | Stop reason: SDUPTHER

## 2019-08-27 ENCOUNTER — OFFICE VISIT (OUTPATIENT)
Dept: FAMILY MEDICINE CLINIC | Facility: CLINIC | Age: 69
End: 2019-08-27

## 2019-08-27 ENCOUNTER — APPOINTMENT (OUTPATIENT)
Dept: LAB | Facility: HOSPITAL | Age: 69
End: 2019-08-27

## 2019-08-27 VITALS
HEIGHT: 66 IN | TEMPERATURE: 99 F | WEIGHT: 170.8 LBS | SYSTOLIC BLOOD PRESSURE: 150 MMHG | DIASTOLIC BLOOD PRESSURE: 98 MMHG | HEART RATE: 71 BPM | OXYGEN SATURATION: 98 % | BODY MASS INDEX: 27.45 KG/M2

## 2019-08-27 DIAGNOSIS — M06.9 RHEUMATOID ARTHRITIS INVOLVING BOTH HANDS, UNSPECIFIED RHEUMATOID FACTOR PRESENCE: ICD-10-CM

## 2019-08-27 DIAGNOSIS — Z00.00 MEDICARE ANNUAL WELLNESS VISIT, INITIAL: Primary | ICD-10-CM

## 2019-08-27 DIAGNOSIS — I10 ESSENTIAL HYPERTENSION: ICD-10-CM

## 2019-08-27 DIAGNOSIS — M25.561 ACUTE PAIN OF RIGHT KNEE: ICD-10-CM

## 2019-08-27 DIAGNOSIS — Z12.31 SCREENING MAMMOGRAM, ENCOUNTER FOR: ICD-10-CM

## 2019-08-27 PROCEDURE — 84443 ASSAY THYROID STIM HORMONE: CPT | Performed by: FAMILY MEDICINE

## 2019-08-27 PROCEDURE — 99214 OFFICE O/P EST MOD 30 MIN: CPT | Performed by: FAMILY MEDICINE

## 2019-08-27 PROCEDURE — 80061 LIPID PANEL: CPT | Performed by: FAMILY MEDICINE

## 2019-08-27 PROCEDURE — G0438 PPPS, INITIAL VISIT: HCPCS | Performed by: FAMILY MEDICINE

## 2019-08-27 PROCEDURE — 96372 THER/PROPH/DIAG INJ SC/IM: CPT | Performed by: FAMILY MEDICINE

## 2019-08-27 PROCEDURE — 80053 COMPREHEN METABOLIC PANEL: CPT | Performed by: FAMILY MEDICINE

## 2019-08-27 PROCEDURE — 85027 COMPLETE CBC AUTOMATED: CPT | Performed by: FAMILY MEDICINE

## 2019-08-27 RX ORDER — CARVEDILOL 25 MG/1
12.5-25 TABLET ORAL 2 TIMES DAILY WITH MEALS
Qty: 60 TABLET | Refills: 0 | Status: SHIPPED | OUTPATIENT
Start: 2019-08-27 | End: 2019-09-18 | Stop reason: SINTOL

## 2019-08-27 RX ORDER — TRIAMCINOLONE ACETONIDE 40 MG/ML
80 INJECTION, SUSPENSION INTRA-ARTICULAR; INTRAMUSCULAR ONCE
Status: COMPLETED | OUTPATIENT
Start: 2019-08-27 | End: 2019-08-27

## 2019-08-27 RX ADMIN — TRIAMCINOLONE ACETONIDE 80 MG: 40 INJECTION, SUSPENSION INTRA-ARTICULAR; INTRAMUSCULAR at 18:02

## 2019-08-27 NOTE — PATIENT INSTRUCTIONS

## 2019-08-27 NOTE — PROGRESS NOTES
The ABCs of the Annual Wellness Visit  Initial Medicare Wellness Visit    Chief Complaint   Patient presents with   • Med Refill   • Medicare Wellness-Initial Visit       Subjective   History of Present Illness:  Lindsay Herrera is a 68 y.o. female who presents for an Initial Medicare Wellness Visit.  WAS TAKING PLAQUENIL ONLY 2 TIMES PER WEEK WHEN SHE THOUGHT SHE NEEDED IT.  HER HANDS DON'T HURT ANYMORE.  SHE WAS GETTING THE PLAQUENIL FROM A [facility].  HER RIGHT KNEE HAS BEEN HURTING, PAIN WHEN SITTING DOWN ON TOILET SEAT.  NO RECENT TRAUMA  SHE WANTS A DIFFERENT BP MEDICINE OTHER THAN THE HCTZ.  SHE IS URINATING ALL THE TIME NO MATTER WHEN SHE TAKES THIS MEDICINE.  SHE HAD COLONOSCOPY 4 YRS AGO, PATIENT REPORTS IT WAS FINE.  SHE HAD BONE DENSITY YEARS AGO, SHE DOESN'T WANT ANOTHER  SHE WILL HAVE A MAMMOGRAM.    HEALTH RISK ASSESSMENT    Recent Hospitalizations:  No hospitalization(s) within the last year.    Current Medical Providers:  Patient Care Team:  Bill Herrera MD as PCP - General (Family Medicine)  Karley Rodríguez APRN as PCP - Claims Attributed    Smoking Status:  Social History     Tobacco Use   Smoking Status Former Smoker   • Types: Cigarettes   • Last attempt to quit: 2013   • Years since quittin.7   Smokeless Tobacco Never Used   Tobacco Comment    Current Smoker       Alcohol Consumption:  Social History     Substance and Sexual Activity   Alcohol Use No       Depression Screen:   PHQ-2/PHQ-9 Depression Screening 2019   Little interest or pleasure in doing things 0   Feeling down, depressed, or hopeless 0   Total Score 0       Fall Risk Screen:  STEADI Fall Risk Assessment has not been completed.    Health Habits and Functional and Cognitive Screening:  Functional & Cognitive Status 2019   Do you have difficulty preparing food and eating? No   Do you have difficulty bathing yourself, getting dressed or grooming yourself? No   Do you have difficulty using the  toilet? No   Do you have difficulty moving around from place to place? No   Do you have trouble with steps or getting out of a bed or a chair? No   Current Diet Well Balanced Diet   Dental Exam Not up to date   Eye Exam Not up to date   Exercise (times per week) 0 times per week   Current Exercise Activities Include None   Do you need help using the phone?  No   Are you deaf or do you have serious difficulty hearing?  No   Do you need help with transportation? No   Do you need help shopping? No   Do you need help preparing meals?  No   Do you need help with housework?  No   Do you need help with laundry? No   Do you need help taking your medications? No   Do you need help managing money? No   Do you ever drive or ride in a car without wearing a seat belt? No   Have you felt unusual stress, anger or loneliness in the last month? No   Who do you live with? Spouse   If you need help, do you have trouble finding someone available to you? No   Have you been bothered in the last four weeks by sexual problems? No   Do you have difficulty concentrating, remembering or making decisions? No         Does the patient have evidence of cognitive impairment? No    Asprin use counseling:Does not need ASA (and currently is not on it)    Age-appropriate Screening Schedule:  Refer to the list below for future screening recommendations based on patient's age, sex and/or medical conditions. Orders for these recommended tests are listed in the plan section. The patient has been provided with a written plan.    Health Maintenance   Topic Date Due   • TDAP/TD VACCINES (1 - Tdap) 10/27/1970   • ZOSTER VACCINE (1 of 2) 10/27/2001   • PNEUMOCOCCAL VACCINES (65+ LOW/MEDIUM RISK) (1 of 2 - PCV13) 10/27/2016   • MAMMOGRAM  11/22/2016   • INFLUENZA VACCINE  08/01/2019   • COLONOSCOPY  05/30/2024          The following portions of the patient's history were reviewed and updated as appropriate: allergies, current medications, past family history,  past medical history, past social history, past surgical history and problem list.    Outpatient Medications Prior to Visit   Medication Sig Dispense Refill   • benazepril (LOTENSIN) 40 MG tablet Take 1 tablet by mouth Daily. 30 tablet 0   • ferrous sulfate 325 (65 FE) MG tablet Take 1 tablet by mouth 2 (Two) Times a Day. 100 tablet 11   • FLUoxetine (PROzac) 20 MG capsule Take 1 capsule by mouth Daily. 30 capsule 0   • simvastatin (ZOCOR) 20 MG tablet Take 1 tablet by mouth Every Night. 30 tablet 0   • hydrochlorothiazide (MICROZIDE) 12.5 MG capsule Take 1 capsule by mouth Daily. 30 capsule 0   • verapamil (CALAN) 80 MG tablet Take 1 tablet by mouth 3 (Three) Times a Day. 90 tablet 0   • hydroxychloroquine (PLAQUENIL) 200 MG tablet Take 1 tablet by mouth Daily. 100 tablet 0   • indomethacin (INDOCIN) 25 MG capsule Take 1 capsule by mouth 2 (Two) Times a Day. For arthritis 180 capsule 1     No facility-administered medications prior to visit.        There is no problem list on file for this patient.      Advanced Care Planning:  Patient does not have an advance directive - information provided to the patient today    Review of Systems   Constitutional: Negative for chills, fatigue and fever.   HENT: Negative for congestion, ear discharge, ear pain, facial swelling, hearing loss, postnasal drip, rhinorrhea, sinus pressure, sore throat, trouble swallowing and voice change.    Eyes: Negative for discharge, redness and visual disturbance.   Respiratory: Negative for cough, chest tightness, shortness of breath and wheezing.    Cardiovascular: Negative for chest pain and palpitations.   Gastrointestinal: Negative for abdominal pain, blood in stool, constipation, diarrhea, nausea and vomiting.   Endocrine: Negative for polydipsia and polyuria.   Genitourinary: Negative for dysuria, flank pain, hematuria and urgency.   Musculoskeletal: Negative for arthralgias, back pain, joint swelling and myalgias.   Skin: Negative for  "rash.   Neurological: Negative for dizziness, weakness, numbness and headaches.   Hematological: Negative for adenopathy.   Psychiatric/Behavioral: Negative for confusion and sleep disturbance. The patient is not nervous/anxious.        Compared to one year ago, the patient feels her physical health is worse.  Compared to one year ago, the patient feels her mental health is worse.    Reviewed chart for potential of high risk medication in the elderly: yes  Reviewed chart for potential of harmful drug interactions in the elderly:yes    Objective         Vitals:    08/27/19 1325   BP: 150/98   BP Location: Left arm   Patient Position: Sitting   Cuff Size: Adult   Pulse: 71   Temp: 99 °F (37.2 °C)   TempSrc: Temporal   SpO2: 98%   Weight: 77.5 kg (170 lb 12.8 oz)   Height: 167.6 cm (65.98\")   PainSc: 0-No pain       Body mass index is 27.58 kg/m².  Discussed the patient's BMI with her. The BMI is above average; no BMI management plan is appropriate..    Physical Exam   Constitutional: She is oriented to person, place, and time. She appears well-developed and well-nourished.   HENT:   Head: Normocephalic and atraumatic.   Right Ear: External ear normal.   Left Ear: External ear normal.   Nose: Nose normal.   Mouth/Throat: Oropharynx is clear and moist.   Eyes: Conjunctivae and EOM are normal. Pupils are equal, round, and reactive to light.   Neck: Normal range of motion. Neck supple.   Cardiovascular: Normal rate, regular rhythm and normal heart sounds. Exam reveals no gallop and no friction rub.   No murmur heard.  Pulmonary/Chest: Effort normal and breath sounds normal.   Abdominal: Soft. Bowel sounds are normal. She exhibits no distension. There is no tenderness. There is no rebound and no guarding.   Musculoskeletal: Normal range of motion. She exhibits no edema or deformity.   Neurological: She is alert and oriented to person, place, and time. No cranial nerve deficit.   Skin: Skin is warm and dry. No rash noted. " No erythema.   Psychiatric: She has a normal mood and affect. Her behavior is normal. Judgment and thought content normal.   Nursing note and vitals reviewed.            Assessment/Plan   Medicare Risks and Personalized Health Plan  CMS Preventative Services Quick Reference  Advance Directive Discussion  Osteoprorosis Risk  Polypharmacy    The above risks/problems have been discussed with the patient.  Pertinent information has been shared with the patient in the After Visit Summary.  Follow up plans and orders are seen below in the Assessment/Plan Section.    Diagnoses and all orders for this visit:    1. Medicare annual wellness visit, initial (Primary)  -     CBC (No Diff)  -     Comprehensive Metabolic Panel  -     Lipid Panel  -     TSH  -     Mammo Screening Digital Tomosynthesis Bilateral With CAD    2. Essential hypertension  -     TSH    3. Rheumatoid arthritis involving both hands, unspecified rheumatoid factor presence (CMS/[facility])  -     TSH    4. Acute pain of right knee  -     triamcinolone acetonide (KENALOG-40) injection 80 mg    5. Screening mammogram, encounter for  -     Mammo Screening Digital Tomosynthesis Bilateral With CAD    Other orders  -     carvedilol (COREG) 25 MG tablet; Take 0.5-1 tablets by mouth 2 (Two) Times a Day With Meals.  Dispense: 60 tablet; Refill: 0      Follow Up:  No Follow-up on file.   RETURN 1 WEEK BP CHECK SINCE CHANGING BP MEDICINE.  STOP HCTZ AND VERAPAMIL 80MG TID.  I HAVE TO USE $4 LIST MEDICINES.  WILL TRY COREG.  START WITH 12.5MG BID AND RETURN 1 WEEK.    SHE DIDN'T WANT KNEE INJECTION BUT INTO HIP    An After Visit Summary and PPPS were given to the patient.

## 2019-08-28 LAB
ALBUMIN SERPL-MCNC: 4.4 G/DL (ref 3.5–5.2)
ALBUMIN/GLOB SERPL: 1.8 G/DL
ALP SERPL-CCNC: 62 U/L (ref 39–117)
ALT SERPL W P-5'-P-CCNC: 13 U/L (ref 1–33)
ANION GAP SERPL CALCULATED.3IONS-SCNC: 13.1 MMOL/L (ref 5–15)
AST SERPL-CCNC: 22 U/L (ref 1–32)
BILIRUB SERPL-MCNC: 0.4 MG/DL (ref 0.2–1.2)
BUN BLD-MCNC: 11 MG/DL (ref 8–23)
BUN/CREAT SERPL: 12.8 (ref 7–25)
CALCIUM SPEC-SCNC: 9.6 MG/DL (ref 8.6–10.5)
CHLORIDE SERPL-SCNC: 102 MMOL/L (ref 98–107)
CHOLEST SERPL-MCNC: 188 MG/DL (ref 0–200)
CO2 SERPL-SCNC: 25.9 MMOL/L (ref 22–29)
CREAT BLD-MCNC: 0.86 MG/DL (ref 0.57–1)
DEPRECATED RDW RBC AUTO: 43.8 FL (ref 37–54)
ERYTHROCYTE [DISTWIDTH] IN BLOOD BY AUTOMATED COUNT: 12.6 % (ref 12.3–15.4)
GFR SERPL CREATININE-BSD FRML MDRD: 66 ML/MIN/1.73
GLOBULIN UR ELPH-MCNC: 2.4 GM/DL
GLUCOSE BLD-MCNC: 86 MG/DL (ref 65–99)
HCT VFR BLD AUTO: 42.1 % (ref 34–46.6)
HDLC SERPL-MCNC: 58 MG/DL (ref 40–60)
HGB BLD-MCNC: 13.7 G/DL (ref 12–15.9)
LDLC SERPL CALC-MCNC: 92 MG/DL (ref 0–100)
LDLC/HDLC SERPL: 1.58 {RATIO}
MCH RBC QN AUTO: 30.7 PG (ref 26.6–33)
MCHC RBC AUTO-ENTMCNC: 32.5 G/DL (ref 31.5–35.7)
MCV RBC AUTO: 94.4 FL (ref 79–97)
PLATELET # BLD AUTO: 190 10*3/MM3 (ref 140–450)
PMV BLD AUTO: 14 FL (ref 6–12)
POTASSIUM BLD-SCNC: 4.5 MMOL/L (ref 3.5–5.2)
PROT SERPL-MCNC: 6.8 G/DL (ref 6–8.5)
RBC # BLD AUTO: 4.46 10*6/MM3 (ref 3.77–5.28)
SODIUM BLD-SCNC: 141 MMOL/L (ref 136–145)
TRIGL SERPL-MCNC: 191 MG/DL (ref 0–150)
TSH SERPL DL<=0.05 MIU/L-ACNC: 2.26 MIU/ML (ref 0.27–4.2)
VLDLC SERPL-MCNC: 38.2 MG/DL (ref 5–40)
WBC NRBC COR # BLD: 3.96 10*3/MM3 (ref 3.4–10.8)

## 2019-09-18 RX ORDER — HYDROCHLOROTHIAZIDE 12.5 MG/1
12.5 TABLET ORAL DAILY
Qty: 30 TABLET | Refills: 11 | Status: SHIPPED | OUTPATIENT
Start: 2019-09-18 | End: 2020-04-04 | Stop reason: SDUPTHER

## 2019-09-18 RX ORDER — VERAPAMIL HYDROCHLORIDE 80 MG/1
80 TABLET ORAL 3 TIMES DAILY
Qty: 90 TABLET | Refills: 11 | Status: SHIPPED | OUTPATIENT
Start: 2019-09-18 | End: 2020-04-04 | Stop reason: SDUPTHER

## 2019-09-18 RX ORDER — BENAZEPRIL HYDROCHLORIDE 40 MG/1
TABLET, FILM COATED ORAL
Qty: 30 TABLET | Refills: 0 | Status: CANCELLED | OUTPATIENT
Start: 2019-09-18

## 2019-09-18 RX ORDER — BENAZEPRIL HYDROCHLORIDE 40 MG/1
40 TABLET, FILM COATED ORAL DAILY
Qty: 30 TABLET | Refills: 11 | Status: SHIPPED | OUTPATIENT
Start: 2019-09-18 | End: 2020-10-21

## 2019-09-18 NOTE — TELEPHONE ENCOUNTER
I SEE ROSARIO ROUTED PATIENT'S EMAIL TO YOU.  SHE CALLED ALSO ASKING IF SHE IS SUPPOSED TO TAKE BENAZEPRIL WITH COREG.  SHE SAID SHE CAME BACK AND HAD HER BP CHECKED AFTER LAST APPT.  SHE NEEDS REFILLS ON BENAZEPRIL.  SHE MAY NEED REFILLS ON THE OTHER MEDS.  I WILL REALLY CONFUSED BY OUR CONVERSATION.    FRACISCO VICENTE

## 2019-10-07 ENCOUNTER — FLU SHOT (OUTPATIENT)
Dept: FAMILY MEDICINE CLINIC | Facility: CLINIC | Age: 69
End: 2019-10-07

## 2019-10-07 DIAGNOSIS — Z23 IMMUNIZATION, PNEUMOCOCCUS AND INFLUENZA: ICD-10-CM

## 2019-10-07 PROCEDURE — G0008 ADMIN INFLUENZA VIRUS VAC: HCPCS | Performed by: FAMILY MEDICINE

## 2019-10-07 PROCEDURE — 90653 IIV ADJUVANT VACCINE IM: CPT | Performed by: FAMILY MEDICINE

## 2019-11-20 ENCOUNTER — OFFICE VISIT (OUTPATIENT)
Dept: FAMILY MEDICINE CLINIC | Facility: CLINIC | Age: 69
End: 2019-11-20

## 2019-11-20 VITALS
HEART RATE: 77 BPM | WEIGHT: 173 LBS | OXYGEN SATURATION: 99 % | DIASTOLIC BLOOD PRESSURE: 80 MMHG | TEMPERATURE: 98.6 F | BODY MASS INDEX: 27.8 KG/M2 | SYSTOLIC BLOOD PRESSURE: 162 MMHG | HEIGHT: 66 IN

## 2019-11-20 DIAGNOSIS — M25.561 CHRONIC PAIN OF RIGHT KNEE: Primary | ICD-10-CM

## 2019-11-20 DIAGNOSIS — G89.29 CHRONIC PAIN OF RIGHT KNEE: Primary | ICD-10-CM

## 2019-11-20 PROCEDURE — 99213 OFFICE O/P EST LOW 20 MIN: CPT | Performed by: NURSE PRACTITIONER

## 2019-11-20 PROCEDURE — 96372 THER/PROPH/DIAG INJ SC/IM: CPT | Performed by: NURSE PRACTITIONER

## 2019-11-20 RX ORDER — IBUPROFEN 600 MG/1
600 TABLET ORAL EVERY 6 HOURS PRN
Qty: 30 TABLET | Refills: 0 | Status: SHIPPED | OUTPATIENT
Start: 2019-11-20 | End: 2020-03-23 | Stop reason: SDUPTHER

## 2019-11-20 RX ORDER — TRIAMCINOLONE ACETONIDE 40 MG/ML
80 INJECTION, SUSPENSION INTRA-ARTICULAR; INTRAMUSCULAR ONCE
Status: COMPLETED | OUTPATIENT
Start: 2019-11-20 | End: 2019-11-20

## 2019-11-20 RX ADMIN — TRIAMCINOLONE ACETONIDE 80 MG: 40 INJECTION, SUSPENSION INTRA-ARTICULAR; INTRAMUSCULAR at 14:02

## 2019-11-20 NOTE — PROGRESS NOTES
"Giselle Herrera is a 69 y.o. female. Knee pain.    History of Present Illness   Knee Pain  Patient presents with knee pain involving the right knee. Onset of the symptoms was several months ago. Inciting event: none known. Current symptoms include pain located \"on both sides of knee cap\". Pain is aggravated by standing and driving. Patient has had prior knee problems. Evaluation to date: none. Treatment to date: avoidance of offending activity, ice, OTC analgesics which are somewhat effective and TENs unit. Pt says she had a steroid shot 3 months ago and it helped with the pain and ibuprofen helps with the pain.     The following portions of the patient's history were reviewed and updated as appropriate: allergies, current medications, past family history, past medical history, past social history, past surgical history and problem list.    Review of Systems   Constitutional: Negative for chills, fatigue and fever.   HENT: Negative for congestion, ear pain, rhinorrhea and sore throat.    Eyes: Negative for blurred vision, double vision and visual disturbance.   Respiratory: Negative for cough, chest tightness, shortness of breath and wheezing.    Cardiovascular: Negative for chest pain, palpitations and leg swelling.   Gastrointestinal: Negative for abdominal pain, diarrhea, nausea and vomiting.   Endocrine: Negative for cold intolerance and heat intolerance.   Genitourinary: Negative for difficulty urinating, dysuria, frequency and hematuria.   Musculoskeletal: Positive for arthralgias (right knee). Negative for back pain, neck pain and neck stiffness.   Skin: Negative for dry skin, pallor, rash, skin lesions and bruise.   Allergic/Immunologic: Negative for environmental allergies, food allergies and immunocompromised state.   Neurological: Negative for dizziness, syncope, weakness, light-headedness, headache and confusion.   Hematological: Negative for adenopathy. Does not bruise/bleed easily. "   Psychiatric/Behavioral: Negative for self-injury, sleep disturbance, suicidal ideas, depressed mood and stress. The patient is not nervous/anxious.        Objective   Physical Exam   Constitutional: She is oriented to person, place, and time. She appears well-developed and well-nourished. She is cooperative. She does not have a sickly appearance. She does not appear ill. No distress.   HENT:   Head: Normocephalic.   Eyes: Conjunctivae, EOM and lids are normal. Pupils are equal, round, and reactive to light.   Neck: Normal range of motion and full passive range of motion without pain. Neck supple.   Cardiovascular: Normal rate, regular rhythm, S1 normal, S2 normal and normal heart sounds.   No murmur heard.  Pulmonary/Chest: Effort normal and breath sounds normal. No respiratory distress. She has no decreased breath sounds. She has no wheezes. She has no rhonchi.   Musculoskeletal: Normal range of motion.        Right knee: She exhibits bony tenderness. She exhibits normal range of motion, no swelling, no effusion, no ecchymosis, no deformity, no laceration, no erythema, normal alignment, no LCL laxity, normal patellar mobility, normal meniscus and no MCL laxity. Tenderness found. Patellar tendon tenderness noted.   Neurological: She is alert and oriented to person, place, and time. She has normal strength. Coordination and gait normal.   Skin: Skin is warm, dry and intact. She is not diaphoretic. No pallor.   Psychiatric: She has a normal mood and affect. Her speech is normal and behavior is normal. Judgment and thought content normal.     Vitals:    11/20/19 1355   BP: 162/80   Pulse:    Temp:    SpO2:    Pulse: 77  Temp: 98.6  SpO2: 99%      Assessment/Plan   Lindsay was seen today for knee pain.    Diagnoses and all orders for this visit:    Chronic pain of right knee  -     ibuprofen (ADVIL,MOTRIN) 600 MG tablet; Take 1 tablet by mouth Every 6 (Six) Hours As Needed for Mild Pain  or Moderate Pain .  -      triamcinolone acetonide (KENALOG-40) injection 80 mg  -     Cancel: XR Knee 1 or 2 View Right (In Office)  -     XR knee 4+ vw right    Chronic pain of right knee- XR's ordered, will follow up with results. Kenalog 80mg given IM. Pt instructed to take ibuprofen 600mg every 6-8 hours PRN for pain. Ice and rest.  If symptoms do not improve or worsen, patient was instructed to return to clinic for further evaluation.         This document has been electronically signed by MARANDA Dailey on  November 20, 2019 2:05 PM

## 2019-11-20 NOTE — PATIENT INSTRUCTIONS
"Osteoarthritis    Osteoarthritis is a type of arthritis that affects tissue that covers the ends of bones in joints (cartilage). Cartilage acts as a cushion between the bones and helps them move smoothly. Osteoarthritis results when cartilage in the joints gets worn down. Osteoarthritis is sometimes called \"wear and tear\" arthritis.  Osteoarthritis is the most common form of arthritis. It often occurs in older people. It is a condition that gets worse over time (a progressive condition). Joints that are most often affected by this condition are in:  · Fingers.  · Toes.  · Hips.  · Knees.  · Spine, including neck and lower back.  What are the causes?  This condition is caused by age-related wearing down of cartilage that covers the ends of bones.  What increases the risk?  The following factors may make you more likely to develop this condition:  · Older age.  · Being overweight or obese.  · Overuse of joints, such as in athletes.  · Past injury of a joint.  · Past surgery on a joint.  · Family history of osteoarthritis.  What are the signs or symptoms?  The main symptoms of this condition are pain, swelling, and stiffness in the joint. The joint may lose its shape over time. Small pieces of bone or cartilage may break off and float inside of the joint, which may cause more pain and damage to the joint. Small deposits of bone (osteophytes) may grow on the edges of the joint. Other symptoms may include:  · A grating or scraping feeling inside the joint when you move it.  · Popping or creaking sounds when you move.  Symptoms may affect one or more joints. Osteoarthritis in a major joint, such as your knee or hip, can make it painful to walk or exercise. If you have osteoarthritis in your hands, you might not be able to  items, twist your hand, or control small movements of your hands and fingers (fine motor skills).  How is this diagnosed?  This condition may be diagnosed based on:  · Your medical history.  · A " physical exam.  · Your symptoms.  · X-rays of the affected joint(s).  · Blood tests to rule out other types of arthritis.  How is this treated?  There is no cure for this condition, but treatment can help to control pain and improve joint function. Treatment plans may include:  · A prescribed exercise program that allows for rest and joint relief. You may work with a physical therapist.  · A weight control plan.  · Pain relief techniques, such as:  ? Applying heat and cold to the joint.  ? Electric pulses delivered to nerve endings under the skin (transcutaneous electrical nerve stimulation, or TENS).  ? Massage.  ? Certain nutritional supplements.  · NSAIDs or prescription medicines to help relieve pain.  · Medicine to help relieve pain and inflammation (corticosteroids). This can be given by mouth (orally) or as an injection.  · Assistive devices, such as a brace, wrap, splint, specialized glove, or cane.  · Surgery, such as:  ? An osteotomy. This is done to reposition the bones and relieve pain or to remove loose pieces of bone and cartilage.  ? Joint replacement surgery. You may need this surgery if you have very bad (advanced) osteoarthritis.  Follow these instructions at home:  Activity  · Rest your affected joints as directed by your health care provider.  · Do not drive or use heavy machinery while taking prescription pain medicine.  · Exercise as directed. Your health care provider or physical therapist may recommend specific types of exercise, such as:  ? Strengthening exercises. These are done to strengthen the muscles that support joints that are affected by arthritis. They can be performed with weights or with exercise bands to add resistance.  ? Aerobic activities. These are exercises, such as brisk walking or water aerobics, that get your heart pumping.  ? Range-of-motion activities. These keep your joints easy to move.  ? Balance and agility exercises.  Managing pain, stiffness, and swelling          · If directed, apply heat to the affected area as often as told by your health care provider. Use the heat source that your health care provider recommends, such as a moist heat pack or a heating pad.  ? If you have a removable assistive device, remove it as told by your health care provider.  ? Place a towel between your skin and the heat source. If your health care provider tells you to keep the assistive device on while you apply heat, place a towel between the assistive device and the heat source.  ? Leave the heat on for 20-30 minutes.  ? Remove the heat if your skin turns bright red. This is especially important if you are unable to feel pain, heat, or cold. You may have a greater risk of getting burned.  · If directed, put ice on the affected joint:  ? If you have a removable assistive device, remove it as told by your health care provider.  ? Put ice in a plastic bag.  ? Place a towel between your skin and the bag. If your health care provider tells you to keep the assistive device on during icing, place a towel between the assistive device and the bag.  ? Leave the ice on for 20 minutes, 2-3 times a day.  General instructions  · Take over-the-counter and prescription medicines only as told by your health care provider.  · Maintain a healthy weight. Follow instructions from your health care provider for weight control. These may include dietary restrictions.  · Do not use any products that contain nicotine or tobacco, such as cigarettes and e-cigarettes. These can delay bone healing. If you need help quitting, ask your health care provider.  · Use assistive devices as directed by your health care provider.  · Keep all follow-up visits as told by your health care provider. This is important.  Where to find more information  · [employer] [place] of Arthritis and Musculoskeletal and Skin Diseases: www.niams.nih.gov  · [employer] [place] on Aging: www.[place].nih.gov  · American College of Rheumatology:  www.rheumatology.org  Contact a health care provider if:  · Your skin turns red.  · You develop a rash.  · You have pain that gets worse.  · You have a fever along with joint or muscle aches.  Get help right away if:  · You lose a lot of weight.  · You suddenly lose your appetite.  · You have night sweats.  Summary  · Osteoarthritis is a type of arthritis that affects tissue covering the ends of bones in joints (cartilage).  · This condition is caused by age-related wearing down of cartilage that covers the ends of bones.  · The main symptom of this condition is pain, swelling, and stiffness in the joint.  · There is no cure for this condition, but treatment can help to control pain and improve joint function.  This information is not intended to replace advice given to you by your health care provider. Make sure you discuss any questions you have with your health care provider.  Document Released: 12/18/2006 Document Revised: 09/24/2018 Document Reviewed: 08/21/2017  [employer] Interactive Patient Education © 2019 [employer]

## 2019-12-02 RX ORDER — SIMVASTATIN 20 MG
TABLET ORAL
Qty: 90 TABLET | Refills: 3 | Status: SHIPPED | OUTPATIENT
Start: 2019-12-02 | End: 2020-04-04 | Stop reason: SDUPTHER

## 2020-01-29 ENCOUNTER — OFFICE VISIT (OUTPATIENT)
Dept: FAMILY MEDICINE CLINIC | Facility: CLINIC | Age: 70
End: 2020-01-29

## 2020-01-29 VITALS
OXYGEN SATURATION: 99 % | WEIGHT: 175.4 LBS | BODY MASS INDEX: 28.19 KG/M2 | TEMPERATURE: 97.9 F | DIASTOLIC BLOOD PRESSURE: 90 MMHG | HEIGHT: 66 IN | SYSTOLIC BLOOD PRESSURE: 150 MMHG | HEART RATE: 92 BPM

## 2020-01-29 DIAGNOSIS — J06.9 ACUTE URI: Primary | ICD-10-CM

## 2020-01-29 DIAGNOSIS — R09.81 HEAD CONGESTION: ICD-10-CM

## 2020-01-29 DIAGNOSIS — H61.21 IMPACTED CERUMEN OF RIGHT EAR: ICD-10-CM

## 2020-01-29 PROCEDURE — 99213 OFFICE O/P EST LOW 20 MIN: CPT | Performed by: NURSE PRACTITIONER

## 2020-01-29 PROCEDURE — 69209 REMOVE IMPACTED EAR WAX UNI: CPT | Performed by: NURSE PRACTITIONER

## 2020-01-29 RX ORDER — OFLOXACIN 3 MG/ML
5 SOLUTION AURICULAR (OTIC) DAILY
Qty: 5 ML | Refills: 0 | Status: SHIPPED | OUTPATIENT
Start: 2020-01-29 | End: 2020-02-05

## 2020-01-29 RX ORDER — BENZONATATE 100 MG/1
100 CAPSULE ORAL 3 TIMES DAILY PRN
Qty: 60 CAPSULE | Refills: 0 | Status: SHIPPED | OUTPATIENT
Start: 2020-01-29 | End: 2020-02-05

## 2020-01-29 RX ORDER — TRIAMCINOLONE ACETONIDE 40 MG/ML
80 INJECTION, SUSPENSION INTRA-ARTICULAR; INTRAMUSCULAR ONCE
Status: SHIPPED | OUTPATIENT
Start: 2020-01-29

## 2020-01-29 NOTE — PROGRESS NOTES
Giselle Herrera is a 69 y.o. female. URI    History of Present Illness   Upper Respiratory Infection  Patient complains of symptoms of a URI. Symptoms include right ear pressure/pain, congestion, cough described as nonproductive and no  fever. Onset of symptoms was 5 days ago, and has been gradually worsening since that time. Treatment to date: cough suppressants. Reports being at rehab facility with her son and says they have a lot of sickness going around. She denies any fever, chills, shortness of breath, nausea, vomiting, or diarrhea.    The following portions of the patient's history were reviewed and updated as appropriate: allergies, current medications, past family history, past medical history, past social history, past surgical history and problem list.    Review of Systems   Constitutional: Negative for chills, fatigue and fever.   HENT: Positive for congestion, ear pain (right ear), hearing loss (right ear) and sore throat. Negative for rhinorrhea.    Eyes: Negative for blurred vision, double vision and visual disturbance.   Respiratory: Positive for cough. Negative for chest tightness, shortness of breath and wheezing.    Cardiovascular: Negative for chest pain, palpitations and leg swelling.   Gastrointestinal: Negative for abdominal pain, diarrhea, nausea and vomiting.   Endocrine: Negative for cold intolerance and heat intolerance.   Genitourinary: Negative for difficulty urinating, dysuria, frequency and hematuria.   Musculoskeletal: Negative for arthralgias, back pain, neck pain and neck stiffness.   Skin: Negative for dry skin, pallor, rash, skin lesions and bruise.   Allergic/Immunologic: Negative for environmental allergies, food allergies and immunocompromised state.   Neurological: Negative for dizziness, syncope, weakness, light-headedness, headache and confusion.   Hematological: Negative for adenopathy. Does not bruise/bleed easily.   Psychiatric/Behavioral: Negative for  self-injury, sleep disturbance, suicidal ideas, depressed mood and stress. The patient is not nervous/anxious.        Objective   Physical Exam   Constitutional: She is oriented to person, place, and time. She appears well-developed and well-nourished. She is cooperative. She does not have a sickly appearance. She does not appear ill. No distress.   HENT:   Head: Normocephalic.   Right Ear: Hearing, tympanic membrane, external ear and ear canal normal. cerumen impaction is present.  Left Ear: Hearing, tympanic membrane, external ear and ear canal normal.   Nose: Congestion present.   Mouth/Throat: Uvula is midline and mucous membranes are normal. Posterior oropharyngeal erythema present.   Eyes: Pupils are equal, round, and reactive to light. Conjunctivae, EOM and lids are normal.   Neck: Trachea normal, normal range of motion, full passive range of motion without pain and phonation normal. Neck supple. No thyroid mass and no thyromegaly present.   Cardiovascular: Normal rate, regular rhythm, S1 normal, S2 normal and normal heart sounds.   No murmur heard.  Pulmonary/Chest: Effort normal and breath sounds normal. No respiratory distress. She has no wheezes. She has no rhonchi. She has no rales.   Abdominal: Soft. Normal appearance. There is no tenderness.   Neurological: She is alert and oriented to person, place, and time. She has normal strength. No cranial nerve deficit. Coordination and gait normal.   Skin: Skin is warm, dry and intact. She is not diaphoretic. No pallor.   Psychiatric: She has a normal mood and affect. Her speech is normal and behavior is normal. Judgment and thought content normal. Cognition and memory are normal.     Vitals:    01/29/20 1123   BP: 150/90   Pulse: 92   Temp: 97.9 °F (36.6 °C)   SpO2: 99%         Assessment/Plan   Lindsay was seen today for cough and earache.    Diagnoses and all orders for this visit:    Acute URI    Impacted cerumen of right ear    Head congestion  -      "triamcinolone acetonide (KENALOG-40) injection 80 mg    Other orders  -     ofloxacin (FLOXIN) 0.3 % otic solution; Administer 5 drops to the right ear Daily for 7 days.  -     benzonatate (TESSALON) 100 MG capsule; Take 1 capsule by mouth 3 (Three) Times a Day As Needed for Cough.    PROCEDURE:  50ml syringe filled with water/hydrogen peroxide solution  Ear flushed until water/hydrogen peroxide solution.  Part of impaction flushed but bleeding noted in canal.  Impaction still in place,unable to visualize TM    URI also known as the \"common cold\" is a viral infection, no antibiotics are necessary. We will treat symptoms and encourage plenty of rest and hydration. Duration of symptoms is 1-1.5 weeks. It's important to practice good hand hygiene. Cough in bend of arm, not your hand. Kenalog 80 mg given to alleviate head congestion. Tessalon 100 mg TID PRN for cough. Stop dayquil because its raising blood pressure, pt instructed to avoid any decongestants.  Impacted cerumen of right ear- Right ear was irrigated but canal got very irritated and started bleeding. Pt instructed to use ofloxacin drops daily for 7 days to cover for otitis externa then return in one week for ear irrigation.  If symptoms do not improve or worsen, patient was instructed to return to clinic for further evaluation.         This document has been electronically signed by MARANDA Dailey on  January 29, 2020 1:34 PM             "

## 2020-01-29 NOTE — PATIENT INSTRUCTIONS
"BMI for Adults    Body mass index (BMI) is a number that is calculated from a person's weight and height. BMI may help to estimate how much of a person's weight is composed of fat. BMI can help identify those who may be at higher risk for certain medical problems.  How is BMI used with adults?  BMI is used as a screening tool to identify possible weight problems. It is used to check whether a person is obese, overweight, healthy weight, or underweight.  How is BMI calculated?  BMI measures your weight and compares it to your height. This can be done either in English ([place] or metric measurements. Note that charts are available to help you find your BMI quickly and easily without having to do these calculations yourself.  To calculate your BMI in English ([place] measurements, your health care provider will:  1. Measure your weight in pounds (lb).  2. Multiply the number of pounds by 703.  ? For example, for a person who weighs 180 lb, multiply that number by 703, which equals 126,540.  3. Measure your height in inches (in). Then multiply that number by itself to get a measurement called \"inches squared.\"  ? For example, for a person who is 70 in tall, the \"inches squared\" measurement is 70 in x 70 in, which equals 4900 inches squared.  4. Divide the total from Step 2 (number of lb x 703) by the total from Step 3 (inches squared): 126,540 ÷ 4900 = 25.8. This is your BMI.  To calculate your BMI in metric measurements, your health care provider will:  1. Measure your weight in kilograms (kg).  2. Measure your height in meters (m). Then multiply that number by itself to get a measurement called \"meters squared.\"  ? For example, for a person who is 1.75 m tall, the \"meters squared\" measurement is 1.75 m x 1.75 m, which is equal to 3.1 meters squared.  3. Divide the number of kilograms (your weight) by the meters squared number. In this example: 70 ÷ 3.1 = 22.6. This is your BMI.  How is BMI interpreted?  To interpret your " results, your health care provider will use BMI charts to identify whether you are underweight, normal weight, overweight, or obese. The following guidelines will be used:  · Underweight: BMI less than 18.5.  · Normal weight: BMI between 18.5 and 24.9.  · Overweight: BMI between 25 and 29.9.  · Obese: BMI of 30 and above.  Please note:  · Weight includes both fat and muscle, so someone with a muscular build, such as an athlete, may have a BMI that is higher than 24.9. In cases like these, BMI is not an accurate measure of body fat.  · To determine if excess body fat is the cause of a BMI of 25 or higher, further assessments may need to be done by a health care provider.  · BMI is usually interpreted in the same way for men and women.  Why is BMI a useful tool?  BMI is useful in two ways:  · Identifying a weight problem that may be related to a medical condition, or that may increase the risk for medical problems.  · Promoting lifestyle and diet changes in order to reach a healthy weight.  Summary  · Body mass index (BMI) is a number that is calculated from a person's weight and height.  · BMI may help to estimate how much of a person's weight is composed of fat. BMI can help identify those who may be at higher risk for certain medical problems.  · BMI can be measured using English measurements or metric measurements.  · To interpret your results, your health care provider will use BMI charts to identify whether you are underweight, normal weight, overweight, or obese.  This information is not intended to replace advice given to you by your health care provider. Make sure you discuss any questions you have with your health care provider.  Document Released: 08/29/2005 Document Revised: 10/31/2018 Document Reviewed: 10/31/2018  [employer] Interactive Patient Education © 2019 [employer]    Upper Respiratory Infection, Adult  An upper respiratory infection (URI) affects the nose, throat, and upper air passages. URIs  "are caused by germs (viruses). The most common type of URI is often called \"the common cold.\"  Medicines cannot cure URIs, but you can do things at home to relieve your symptoms. URIs usually get better within 7-10 days.  Follow these instructions at home:  Activity  · Rest as needed.  · If you have a fever, stay home from work or school until your fever is gone, or until your doctor says you may return to work or school.  ? You should stay home until you cannot spread the infection anymore (you are not contagious).  ? Your doctor may have you wear a face mask so you have less risk of spreading the infection.  Relieving symptoms  · Gargle with a salt-water mixture 3-4 times a day or as needed. To make a salt-water mixture, completely dissolve ½-1 tsp of salt in 1 cup of warm water.  · Use a cool-mist humidifier to add moisture to the air. This can help you breathe more easily.  Eating and drinking    · Drink enough fluid to keep your pee (urine) pale yellow.  · Eat soups and other clear broths.  General instructions    · Take over-the-counter and prescription medicines only as told by your doctor. These include cold medicines, fever reducers, and cough suppressants.  · Do not use any products that contain nicotine or tobacco. These include cigarettes and e-cigarettes. If you need help quitting, ask your doctor.  · Avoid being where people are smoking (avoid secondhand smoke).  · Make sure you get regular shots and get the flu shot every year.  · Keep all follow-up visits as told by your doctor. This is important.  How to avoid spreading infection to others    · Wash your hands often with soap and water. If you do not have soap and water, use hand .  · Avoid touching your mouth, face, eyes, or nose.  · Cough or sneeze into a tissue or your sleeve or elbow. Do not cough or sneeze into your hand or into the air.  Contact a doctor if:  · You are getting worse, not better.  · You have any of these:  ? A " "fever.  ? Chills.  ? Brown or red mucus in your nose.  ? Yellow or brown fluid (discharge)coming from your nose.  ? Pain in your face, especially when you bend forward.  ? Swollen neck glands.  ? Pain with swallowing.  ? White areas in the back of your throat.  Get help right away if:  · You have shortness of breath that gets worse.  · You have very bad or constant:  ? Headache.  ? Ear pain.  ? Pain in your forehead, behind your eyes, and over your cheekbones (sinus pain).  ? Chest pain.  · You have long-lasting (chronic) lung disease along with any of these:  ? Wheezing.  ? Long-lasting cough.  ? Coughing up blood.  ? A change in your usual mucus.  · You have a stiff neck.  · You have changes in your:  ? Vision.  ? Hearing.  ? Thinking.  ? Mood.  Summary  · An upper respiratory infection (URI) is caused by a germ called a virus. The most common type of URI is often called \"the common cold.\"  · URIs usually get better within 7-10 days.  · Take over-the-counter and prescription medicines only as told by your doctor.  This information is not intended to replace advice given to you by your health care provider. Make sure you discuss any questions you have with your health care provider.  Document Released: 06/05/2009 Document Revised: 08/10/2018 Document Reviewed: 08/10/2018  [employer] Interactive Patient Education © 2019 [employer]    Earwax Buildup, Adult  The ears produce a substance called earwax that helps keep bacteria out of the ear and protects the skin in the ear canal. Occasionally, earwax can build up in the ear and cause discomfort or hearing loss.  What increases the risk?  This condition is more likely to develop in people who:  · Are male.  · Are elderly.  · Naturally produce more earwax.  · Clean their ears often with cotton swabs.  · Use earplugs often.  · Use in-ear headphones often.  · Wear hearing aids.  · Have narrow ear canals.  · Have earwax that is overly thick or sticky.  · Have " eczema.  · Are dehydrated.  · Have excess hair in the ear canal.  What are the signs or symptoms?  Symptoms of this condition include:  · Reduced or muffled hearing.  · A feeling of fullness in the ear or feeling that the ear is plugged.  · Fluid coming from the ear.  · Ear pain.  · Ear itch.  · Ringing in the ear.  · Coughing.  · An obvious piece of earwax that can be seen inside the ear canal.  How is this diagnosed?  This condition may be diagnosed based on:  · Your symptoms.  · Your medical history.  · An ear exam. During the exam, your health care provider will look into your ear with an instrument called an otoscope.  You may have tests, including a hearing test.  How is this treated?  This condition may be treated by:  · Using ear drops to soften the earwax.  · Having the earwax removed by a health care provider. The health care provider may:  ? Flush the ear with water.  ? Use an instrument that has a loop on the end (curette).  ? Use a suction device.  · Surgery to remove the wax buildup. This may be done in severe cases.  Follow these instructions at home:    · Take over-the-counter and prescription medicines only as told by your health care provider.  · Do not put any objects, including cotton swabs, into your ear. You can clean the opening of your ear canal with a washcloth or facial tissue.  · Follow instructions from your health care provider about cleaning your ears. Do not over-clean your ears.  · Drink enough fluid to keep your urine clear or pale yellow. This will help to thin the earwax.  · Keep all follow-up visits as told by your health care provider. If earwax builds up in your ears often or if you use hearing aids, consider seeing your health care provider for routine, preventive ear cleanings. Ask your health care provider how often you should schedule your cleanings.  · If you have hearing aids, clean them according to instructions from the  and your health care  provider.  Contact a health care provider if:  · You have ear pain.  · You develop a fever.  · You have blood, pus, or other fluid coming from your ear.  · You have hearing loss.  · You have ringing in your ears that does not go away.  · Your symptoms do not improve with treatment.  · You feel like the room is spinning (vertigo).  Summary  · Earwax can build up in the ear and cause discomfort or hearing loss.  · The most common symptoms of this condition include reduced or muffled hearing and a feeling of fullness in the ear or feeling that the ear is plugged.  · This condition may be diagnosed based on your symptoms, your medical history, and an ear exam.  · This condition may be treated by using ear drops to soften the earwax or by having the earwax removed by a health care provider.  · Do not put any objects, including cotton swabs, into your ear. You can clean the opening of your ear canal with a washcloth or facial tissue.  This information is not intended to replace advice given to you by your health care provider. Make sure you discuss any questions you have with your health care provider.  Document Released: 01/25/2006 Document Revised: 11/29/2018 Document Reviewed: 02/28/2018  [employer] Interactive Patient Education © 2019 [employer]

## 2020-02-05 ENCOUNTER — OFFICE VISIT (OUTPATIENT)
Dept: FAMILY MEDICINE CLINIC | Facility: CLINIC | Age: 70
End: 2020-02-05

## 2020-02-05 VITALS
SYSTOLIC BLOOD PRESSURE: 144 MMHG | OXYGEN SATURATION: 99 % | HEIGHT: 66 IN | WEIGHT: 175.6 LBS | TEMPERATURE: 97.3 F | DIASTOLIC BLOOD PRESSURE: 74 MMHG | HEART RATE: 85 BPM | BODY MASS INDEX: 28.22 KG/M2

## 2020-02-05 DIAGNOSIS — H61.21 IMPACTED CERUMEN OF RIGHT EAR: Primary | ICD-10-CM

## 2020-02-05 PROCEDURE — 99212 OFFICE O/P EST SF 10 MIN: CPT | Performed by: NURSE PRACTITIONER

## 2020-02-05 PROCEDURE — 69209 REMOVE IMPACTED EAR WAX UNI: CPT | Performed by: NURSE PRACTITIONER

## 2020-02-05 NOTE — PROGRESS NOTES
Giselle Herrera is a 69 y.o. female. Cerumen impaction.    History of Present Illness Pt presents for cerumen impaction of right ear. At her last visit she had ear irrigation that was unsuccessful. She says she continues to have loss of hearing in that ear. Denies any right ear pain.    The following portions of the patient's history were reviewed and updated as appropriate: allergies, current medications, past family history, past medical history, past social history, past surgical history and problem list.    Review of Systems   Constitutional: Negative for chills, fatigue and fever.   HENT: Positive for hearing loss (right side). Negative for congestion, ear pain, rhinorrhea and sore throat.    Eyes: Negative for blurred vision, double vision and visual disturbance.   Respiratory: Negative for cough, chest tightness, shortness of breath and wheezing.    Cardiovascular: Negative for chest pain, palpitations and leg swelling.   Gastrointestinal: Negative for abdominal pain, diarrhea, nausea and vomiting.   Endocrine: Negative for cold intolerance and heat intolerance.   Genitourinary: Negative for difficulty urinating, dysuria, frequency and hematuria.   Musculoskeletal: Negative for arthralgias, back pain, neck pain and neck stiffness.   Skin: Negative for dry skin, pallor, rash, skin lesions and bruise.   Allergic/Immunologic: Negative for environmental allergies, food allergies and immunocompromised state.   Neurological: Negative for dizziness, syncope, weakness, light-headedness, headache and confusion.   Hematological: Negative for adenopathy. Does not bruise/bleed easily.   Psychiatric/Behavioral: Negative for self-injury, sleep disturbance, suicidal ideas, depressed mood and stress. The patient is not nervous/anxious.        Objective   Physical Exam   Constitutional: She is oriented to person, place, and time. She appears well-developed and well-nourished. She is cooperative. She does not  have a sickly appearance. She does not appear ill. No distress.   HENT:   Head: Normocephalic.   Right Ear: Hearing, tympanic membrane, external ear and ear canal normal. cerumen impaction is present.  Left Ear: Hearing, tympanic membrane, external ear and ear canal normal.   Right ear WNL after cerumen impaction dislodged.    Eyes: Pupils are equal, round, and reactive to light. Conjunctivae, EOM and lids are normal.   Neck: Normal range of motion and full passive range of motion without pain. Neck supple.   Musculoskeletal: Normal range of motion.   Neurological: She is alert and oriented to person, place, and time. She has normal strength. Coordination and gait normal.   Skin: Skin is warm, dry and intact. She is not diaphoretic. No pallor.   Psychiatric: She has a normal mood and affect. Her speech is normal and behavior is normal. Judgment and thought content normal.     Vitals:    02/05/20 1310   BP: 144/74   Pulse: 85   Temp: 97.3 °F (36.3 °C)   SpO2: 99%         Assessment/Plan   Lindsay was seen today for cerumen impaction.    Diagnoses and all orders for this visit:    Impacted cerumen of right ear    1 ml of liquid colace instilled in ear and let sit for 5 minutes.  50ml syringe filled with water/hydrogen peroxide solution  Ear flushed until water/hydrogen peroxide solution ran clear.  Cerumen impaction dislodged.  TM remains intact.     Impacted cerumen of right ear- Cerumen impaction dislodged. Pt says hearing improved. TM remained intact.   If symptoms do not improve or worsen, patient was instructed to return to clinic for further evaluation.         This document has been electronically signed by MARANDA Dailey on  February 5, 2020 2:11 PM

## 2020-03-23 DIAGNOSIS — G89.29 CHRONIC PAIN OF RIGHT KNEE: ICD-10-CM

## 2020-03-23 DIAGNOSIS — M25.561 CHRONIC PAIN OF RIGHT KNEE: ICD-10-CM

## 2020-03-23 RX ORDER — IBUPROFEN 600 MG/1
600 TABLET ORAL EVERY 6 HOURS PRN
Qty: 30 TABLET | Refills: 0 | Status: CANCELLED | OUTPATIENT
Start: 2020-03-23

## 2020-03-23 RX ORDER — IBUPROFEN 600 MG/1
600 TABLET ORAL EVERY 8 HOURS PRN
Qty: 90 TABLET | Refills: 5 | Status: SHIPPED | OUTPATIENT
Start: 2020-03-23 | End: 2022-05-05

## 2020-04-06 RX ORDER — VERAPAMIL HYDROCHLORIDE 80 MG/1
80 TABLET ORAL 3 TIMES DAILY
Qty: 90 TABLET | Refills: 3 | Status: SHIPPED | OUTPATIENT
Start: 2020-04-06 | End: 2021-11-01

## 2020-04-06 RX ORDER — SIMVASTATIN 20 MG
20 TABLET ORAL
Qty: 90 TABLET | Refills: 3 | Status: SHIPPED | OUTPATIENT
Start: 2020-04-06 | End: 2021-11-01

## 2020-04-06 RX ORDER — HYDROCHLOROTHIAZIDE 12.5 MG/1
12.5 TABLET ORAL DAILY
Qty: 30 TABLET | Refills: 4 | Status: SHIPPED | OUTPATIENT
Start: 2020-04-06 | End: 2021-09-14

## 2020-06-10 ENCOUNTER — OFFICE VISIT (OUTPATIENT)
Dept: FAMILY MEDICINE CLINIC | Facility: CLINIC | Age: 70
End: 2020-06-10

## 2020-06-10 VITALS
HEART RATE: 81 BPM | SYSTOLIC BLOOD PRESSURE: 118 MMHG | TEMPERATURE: 98.3 F | BODY MASS INDEX: 27.77 KG/M2 | OXYGEN SATURATION: 98 % | DIASTOLIC BLOOD PRESSURE: 80 MMHG | HEIGHT: 66 IN | WEIGHT: 172.8 LBS

## 2020-06-10 DIAGNOSIS — M79.605 LEFT LEG PAIN: Primary | ICD-10-CM

## 2020-06-10 PROCEDURE — 96372 THER/PROPH/DIAG INJ SC/IM: CPT | Performed by: FAMILY MEDICINE

## 2020-06-10 PROCEDURE — 99213 OFFICE O/P EST LOW 20 MIN: CPT | Performed by: FAMILY MEDICINE

## 2020-06-10 RX ORDER — TRIAMCINOLONE ACETONIDE 40 MG/ML
80 INJECTION, SUSPENSION INTRA-ARTICULAR; INTRAMUSCULAR ONCE
Status: COMPLETED | OUTPATIENT
Start: 2020-06-10 | End: 2020-06-10

## 2020-06-10 RX ADMIN — TRIAMCINOLONE ACETONIDE 80 MG: 40 INJECTION, SUSPENSION INTRA-ARTICULAR; INTRAMUSCULAR at 14:42

## 2020-06-10 NOTE — PROGRESS NOTES
" Giselle Herrera is a 69 y.o. female.     History of Present Illness     Right knee pain, December steroid shot and it got better.  It is now just starting to hurt again and wants another shot  Also  Recent left leg pain.  Pain is posterior to left greater trochanter bursa and extends posterior and laterally down thigh to knee. Hurt all night as well. No trauma.     Review of Systems   Constitutional: Negative for chills, fatigue and fever.   HENT: Negative for congestion, ear discharge, ear pain, facial swelling, hearing loss, postnasal drip, rhinorrhea, sinus pressure, sore throat, trouble swallowing and voice change.    Eyes: Negative for discharge, redness and visual disturbance.   Respiratory: Negative for cough, chest tightness, shortness of breath and wheezing.    Cardiovascular: Negative for chest pain and palpitations.   Gastrointestinal: Negative for abdominal pain, blood in stool, constipation, diarrhea, nausea and vomiting.   Endocrine: Negative for polydipsia and polyuria.   Genitourinary: Negative for dysuria, flank pain, hematuria and urgency.   Musculoskeletal: Positive for arthralgias. Negative for back pain, joint swelling and myalgias.   Skin: Negative for rash.   Neurological: Negative for dizziness, weakness, numbness and headaches.   Hematological: Negative for adenopathy.   Psychiatric/Behavioral: Negative for confusion and sleep disturbance. The patient is not nervous/anxious.            /80 (BP Location: Left arm, Patient Position: Sitting, Cuff Size: Adult)   Pulse 81   Temp 98.3 °F (36.8 °C) (Temporal)   Ht 167.6 cm (65.98\")   Wt 78.4 kg (172 lb 12.8 oz)   LMP  (LMP Unknown)   SpO2 98%   BMI 27.90 kg/m²       Objective     Physical Exam   Constitutional: She is oriented to person, place, and time. She appears well-developed and well-nourished.   HENT:   Head: Normocephalic and atraumatic.   Right Ear: External ear normal.   Left Ear: External ear normal.   Nose: " Nose normal.   Eyes: Pupils are equal, round, and reactive to light. Conjunctivae and EOM are normal.   Neck: Normal range of motion.   Pulmonary/Chest: Effort normal.   Musculoskeletal: Normal range of motion.   Tenderness is more posterior to left greater trochanter bursa area,   Neurological: She is alert and oriented to person, place, and time.   Psychiatric: She has a normal mood and affect. Her behavior is normal. Judgment and thought content normal.   Nursing note and vitals reviewed.          PAST MEDICAL HISTORY     Past Medical History:   Diagnosis Date   • Artificial lens present     Artificial lens in position - both     • Nuclear senile cataract of left eye    • Visual discomfort     Visual discomfort - R>L       PAST SURGICAL HISTORY     Past Surgical History:   Procedure Laterality Date   • CATARACT EXTRACTION Left 2015    Remove cataract, insert lens (2) - left eye   • OTHER SURGICAL HISTORY  2015    OPTICAL BIOMETRY 11613 (2) - AMARILIS Dubon      SOCIAL HISTORY     Social History     Socioeconomic History   • Marital status:      Spouse name: Not on file   • Number of children: Not on file   • Years of education: Not on file   • Highest education level: Not on file   Tobacco Use   • Smoking status: Former Smoker     Types: Cigarettes     Last attempt to quit: 2020     Years since quittin.4   • Smokeless tobacco: Never Used   Substance and Sexual Activity   • Alcohol use: No   • Drug use: No   • Sexual activity: Not Currently      ALLERGIES   Patient has no known allergies.   MEDICATIONS     Current Outpatient Medications   Medication Sig Dispense Refill   • benazepril (LOTENSIN) 40 MG tablet Take 1 tablet by mouth Daily. 30 tablet 11   • ferrous sulfate 325 (65 FE) MG tablet Take 1 tablet by mouth 2 (Two) Times a Day. 100 tablet 11   • FLUoxetine (PROzac) 20 MG capsule Take 1 capsule by mouth Daily. 30 capsule 0   • hydroCHLOROthiazide (HYDRODIURIL) 12.5 MG tablet Take 1  tablet by mouth Daily. 30 tablet 4   • ibuprofen (ADVIL,MOTRIN) 600 MG tablet Take 1 tablet by mouth Every 8 (Eight) Hours As Needed for Mild Pain  or Moderate Pain . 90 tablet 5   • simvastatin (ZOCOR) 20 MG tablet Take 1 tablet by mouth every night at bedtime. 90 tablet 3   • verapamil (Calan) 80 MG tablet Take 1 tablet by mouth 3 (Three) Times a Day. 90 tablet 3     Current Facility-Administered Medications   Medication Dose Route Frequency Provider Last Rate Last Dose   • triamcinolone acetonide (KENALOG-40) injection 80 mg  80 mg Intramuscular Once Karley Rodríguez APRN            The following portions of the patient's history were reviewed and updated as appropriate: allergies, current medications, past family history, past medical history, past social history, past surgical history and problem list.        Assessment/Plan   Lindsay was seen today for hip pain.    Diagnoses and all orders for this visit:    Left leg pain  -     triamcinolone acetonide (KENALOG-40) injection 80 mg      It may be a bursitis.  If this doesn't work I will get xray left hip and consider injecting the bursa.                  No follow-ups on file.                  This document has been electronically signed by Bill Herrera MD on Lidia 10, 2020 14:48

## 2020-06-15 ENCOUNTER — TELEPHONE (OUTPATIENT)
Dept: FAMILY MEDICINE CLINIC | Facility: CLINIC | Age: 70
End: 2020-06-15

## 2020-06-15 DIAGNOSIS — M79.605 LEFT LEG PAIN: Primary | ICD-10-CM

## 2020-06-15 NOTE — TELEPHONE ENCOUNTER
----- Message from Afia Pérez MA sent at 6/15/2020  8:35 AM CDT -----  Regarding: FW: Visit Follow-Up Question  Contact: [phone]  See message   ----- Message -----  From: Lindsay Herrera  Sent: 6/14/2020   2:37 PM CDT  To: Desire Ochoa [facility]  Subject: Visit Follow-Up Question                         when i came  in last week for a shot you ask if i had fell and i said no.i must have dementia. i fell about 3 weeks ago and i dont know how i forgot that. it was the  worse fall i ever had.i was putting up a fence around the [employer] [place] lost my balance and my right foot went in between the spokes and i fell backwards and all my weight was on the left side..i was black and blue but it went away..then week or so ago the pain came back i cant sit or lay on left side  or anyway for that matter. i was reading online maybe i have a fractured hip. do you do xrays there?i might need a xray ty

## 2020-06-23 ENCOUNTER — TELEPHONE (OUTPATIENT)
Dept: FAMILY MEDICINE CLINIC | Facility: CLINIC | Age: 70
End: 2020-06-23

## 2020-06-23 NOTE — TELEPHONE ENCOUNTER
----- Message from Afia Pérez MA sent at 6/23/2020 10:11 AM CDT -----  Regarding: FW: Visit Follow-Up Question  Contact: [phone]  See message   ----- Message -----  From: Lindsay Herrera  Sent: 6/23/2020  10:11 AM CDT  To: Desire Ochoa [facility]  Subject: Visit Follow-Up Question                         yes my burstsis flasred up sunday i would like that shot in the musule please i cxan come today i have took alieve .ibuprofen leg cramp nothing helps ty

## 2020-06-24 ENCOUNTER — CLINICAL SUPPORT (OUTPATIENT)
Dept: FAMILY MEDICINE CLINIC | Facility: CLINIC | Age: 70
End: 2020-06-24

## 2020-06-24 ENCOUNTER — TELEPHONE (OUTPATIENT)
Dept: FAMILY MEDICINE CLINIC | Facility: CLINIC | Age: 70
End: 2020-06-24

## 2020-06-24 DIAGNOSIS — M25.559 HIP PAIN: Primary | ICD-10-CM

## 2020-06-24 DIAGNOSIS — M25.559 HIP PAIN: ICD-10-CM

## 2020-06-24 PROCEDURE — 96372 THER/PROPH/DIAG INJ SC/IM: CPT | Performed by: FAMILY MEDICINE

## 2020-06-24 RX ORDER — KETOROLAC TROMETHAMINE 30 MG/ML
60 INJECTION, SOLUTION INTRAMUSCULAR; INTRAVENOUS ONCE
Status: COMPLETED | OUTPATIENT
Start: 2020-06-24 | End: 2020-06-24

## 2020-06-24 RX ADMIN — KETOROLAC TROMETHAMINE 60 MG: 30 INJECTION, SOLUTION INTRAMUSCULAR; INTRAVENOUS at 14:48

## 2020-06-24 NOTE — TELEPHONE ENCOUNTER
----- Message from Nichole Packer MA sent at 6/24/2020  1:25 PM CDT -----  Regarding: Visit Follow-Up Question  Contact: [phone]      ----- Message -----  From: Lindsay Herrera  Sent: 6/24/2020   6:10 AM CDT  [facility]  Subject: Visit Follow-Up Question                         Well Linda i have tried everything nothing helps i need a pain shot not a steriod it should be better by now its been since sunday.i cant lay around for ever i got to take care of debbie and i dogs.

## 2020-06-25 ENCOUNTER — TELEPHONE (OUTPATIENT)
Dept: FAMILY MEDICINE CLINIC | Facility: CLINIC | Age: 70
End: 2020-06-25

## 2020-06-25 NOTE — TELEPHONE ENCOUNTER
----- Message from Nichole Packer MA sent at 6/25/2020 11:43 AM CDT -----  Regarding: Visit Follow-Up Question  Contact: [phone]      ----- Message -----  From: Lindsay Herrera  Sent: 6/25/2020  10:36 AM CDT  To: Desire Ochoa [facility]  Subject: Visit Follow-Up Question                         just wanted to follow up..the shot lasted 12 hours about 1 am the pain and shooting started again..now i dont know what to do i guess i will stay off it as much as i can ,hopefully it will go away if not in  a couple weeks i might have to do the mri or scan thank you the whole staff is wonderfull ty

## 2020-06-25 NOTE — TELEPHONE ENCOUNTER
----- Message from Nichole Packer MA sent at 6/25/2020 11:43 AM CDT -----  Regarding: Visit Follow-Up Question  Contact: [phone]      ----- Message -----  From: Lindsay Herrera  Sent: 6/25/2020  10:36 AM CDT  To: Desire Ochoa [facility]  Subject: Visit Follow-Up Question                         just wanted to follow up..the shot lasted 12 hours about 1 am the pain and shooting started again..now i dont know what to do i guess i will stay off it as much as i can ,hopefully it will go away if not in  a couple weeks i might have to do the mri or scan thank you the whole staff is wonderfull ty

## 2020-09-11 ENCOUNTER — OFFICE VISIT (OUTPATIENT)
Dept: FAMILY MEDICINE CLINIC | Facility: CLINIC | Age: 70
End: 2020-09-11

## 2020-09-11 VITALS
BODY MASS INDEX: 27.68 KG/M2 | HEIGHT: 66 IN | OXYGEN SATURATION: 98 % | DIASTOLIC BLOOD PRESSURE: 88 MMHG | WEIGHT: 172.2 LBS | SYSTOLIC BLOOD PRESSURE: 144 MMHG | TEMPERATURE: 98.2 F | HEART RATE: 67 BPM

## 2020-09-11 DIAGNOSIS — Z12.31 ENCOUNTER FOR SCREENING MAMMOGRAM FOR MALIGNANT NEOPLASM OF BREAST: ICD-10-CM

## 2020-09-11 DIAGNOSIS — Z23 NEED FOR TDAP VACCINATION: ICD-10-CM

## 2020-09-11 DIAGNOSIS — K21.9 GASTROESOPHAGEAL REFLUX DISEASE, ESOPHAGITIS PRESENCE NOT SPECIFIED: ICD-10-CM

## 2020-09-11 DIAGNOSIS — Z00.00 MEDICARE ANNUAL WELLNESS VISIT, SUBSEQUENT: Primary | ICD-10-CM

## 2020-09-11 PROCEDURE — G0439 PPPS, SUBSEQ VISIT: HCPCS | Performed by: NURSE PRACTITIONER

## 2020-09-11 PROCEDURE — 90471 IMMUNIZATION ADMIN: CPT | Performed by: NURSE PRACTITIONER

## 2020-09-11 PROCEDURE — 90715 TDAP VACCINE 7 YRS/> IM: CPT | Performed by: NURSE PRACTITIONER

## 2020-09-11 RX ORDER — FAMOTIDINE 20 MG/1
20 TABLET, FILM COATED ORAL 2 TIMES DAILY PRN
Qty: 60 TABLET | Refills: 0 | Status: SHIPPED | OUTPATIENT
Start: 2020-09-11 | End: 2022-05-05

## 2020-09-11 NOTE — PATIENT INSTRUCTIONS
Food Choices for Gastroesophageal Reflux Disease, Adult  When you have gastroesophageal reflux disease (GERD), the foods you eat and your eating habits are very important. Choosing the right foods can help ease your discomfort. Think about working with a nutrition specialist (dietitian) to help you make good choices.  What are tips for following this plan?    Meals  · Choose healthy foods that are low in fat, such as fruits, vegetables, whole grains, low-fat dairy products, and lean meat, fish, and poultry.  · Eat small meals often instead of 3 large meals a day. Eat your meals slowly, and in a place where you are relaxed. Avoid bending over or lying down until 2-3 hours after eating.  · Avoid eating meals 2-3 hours before bed.  · Avoid drinking a lot of liquid with meals.  · Cook foods using methods other than frying. Bake, grill, or broil food instead.  · Avoid or limit:  ? Chocolate.  ? Peppermint or spearmint.  ? Alcohol.  ? Pepper.  ? Black and decaffeinated coffee.  ? Black and decaffeinated tea.  ? Bubbly (carbonated) soft drinks.  ? Caffeinated energy drinks and soft drinks.  · Limit high-fat foods such as:  ? Fatty meat or fried foods.  ? Whole milk, cream, butter, or ice cream.  ? Nuts and nut butters.  ? Pastries, donuts, and sweets made with butter or shortening.  · Avoid foods that cause symptoms. These foods may be different for everyone. Common foods that cause symptoms include:  ? Tomatoes.  ? Oranges, flor, and limes.  ? Peppers.  ? Spicy food.  ? Onions and garlic.  ? Vinegar.  Lifestyle  · Maintain a healthy weight. Ask your doctor what weight is healthy for you. If you need to lose weight, work with your doctor to do so safely.  · Exercise for at least 30 minutes for 5 or more days each week, or as told by your doctor.  · Wear loose-fitting clothes.  · Do not smoke. If you need help quitting, ask your doctor.  · Sleep with the head of your bed higher than your feet. Use a wedge under the  mattress or blocks under the bed frame to raise the head of the bed.  Summary  · When you have gastroesophageal reflux disease (GERD), food and lifestyle choices are very important in easing your symptoms.  · Eat small meals often instead of 3 large meals a day. Eat your meals slowly, and in a place where you are relaxed.  · Limit high-fat foods such as fatty meat or fried foods.  · Avoid bending over or lying down until 2-3 hours after eating.  · Avoid peppermint and spearmint, caffeine, alcohol, and chocolate.  This information is not intended to replace advice given to you by your health care provider. Make sure you discuss any questions you have with your health care provider.  Document Released: 06/18/2013 Document Revised: 04/09/2020 Document Reviewed: 01/23/2018  Elsevier Patient Education © 2020 [employer]      Breast Self-Awareness  Breast self-awareness means being familiar with how your breasts look and feel. It involves checking your breasts regularly and reporting any changes to your health care provider.  Practicing breast self-awareness is important. Sometimes changes may not be harmful (are benign), but sometimes a change in your breasts can be a sign of a serious medical problem. It is important to learn how to do this procedure correctly so that you can catch problems early, when treatment is more likely to be successful. All women should practice breast self-awareness, including women who have had breast implants.  What you need:  · A mirror.  · A well-lit room.  How to do a breast self-exam  A breast self-exam is one way to learn what is normal for your breasts and whether your breasts are changing. To do a breast self-exam:  Look for changes    1. Remove all the clothing above your waist.  2.  front of a mirror in a room with good lighting.  3. Put your hands on your hips.  4. Push your hands firmly downward.  5. Compare your breasts in the mirror. Look for differences between them  (asymmetry), such as:  ? Differences in shape.  ? Differences in size.  ? Puckers, dips, and bumps in one breast and not the other.  6. Look at each breast for changes in the skin, such as:  ? Redness.  ? Scaly areas.  7. Look for changes in your nipples, such as:  ? Discharge.  ? Bleeding.  ? Dimpling.  ? Redness.  ? A change in position.  Feel for changes  Carefully feel your breasts for lumps and changes. It is best to do this while lying on your back on the floor, and again while sitting or standing in the tub or shower with soapy water on your skin. Feel each breast in the following way:  1. Place the arm on the side of the breast you are examining above your head.  2. Feel your breast with the other hand.  3. Start in the nipple area and make ¾-inch (2 cm) overlapping circles to feel your breast. Use the pads of your three middle fingers to do this. Apply light pressure, then medium pressure, then firm pressure. The light pressure will allow you to feel the tissue closest to the skin. The medium pressure will allow you to feel the tissue that is a little deeper. The firm pressure will allow you to feel the tissue close to the ribs.  4. Continue the overlapping circles, moving downward over the breast until you feel your ribs below your breast.  5. Move one finger-width toward the center of the body. Continue to use the ¾-inch (2 cm) overlapping circles to feel your breast as you move slowly up toward your collarbone.  6. Continue the up-and-down exam using all three pressures until you reach your armpit.    Write down what you find  Writing down what you find can help you remember what to discuss with your health care provider. Write down:  · What is normal for each breast.  · Any changes that you find in each breast, including:  ? The kind of changes you find.  ? Any pain or tenderness.  ? Size and location of any lumps.  · Where you are in your menstrual cycle, if you are still menstruating.  General tips  and recommendations  · Examine your breasts every month.  · If you are breastfeeding, the best time to examine your breasts is after a feeding or after using a breast pump.  · If you menstruate, the best time to examine your breasts is 5-7 days after your period. Breasts are generally lumpier during menstrual periods, and it may be more difficult to notice changes.  · With time and practice, you will become more familiar with the variations in your breasts and more comfortable with the exam.  Contact a health care provider if you:  · See a change in the shape or size of your breasts or nipples.  · See a change in the skin of your breast or nipples, such as a reddened or scaly area.  · Have unusual discharge from your nipples.  · Find a lump or thick area that was not there before.  · Have pain in your breasts.  · Have any concerns related to your breast health.  Summary  · Breast self-awareness includes looking for physical changes in your breasts, as well as feeling for any changes within your breasts.  · Breast self-awareness should be performed in front of a mirror in a well-lit room.  · You should examine your breasts every month. If you menstruate, the best time to examine your breasts is 5-7 days after your menstrual period.  · Let your health care provider know of any changes you notice in your breasts, including changes in size, changes on the skin, pain or tenderness, or unusual fluid from your nipples.  This information is not intended to replace advice given to you by your health care provider. Make sure you discuss any questions you have with your health care provider.  Document Released: 12/18/2006 Document Revised: 08/06/2019 Document Reviewed: 08/06/2019  Elsevier Patient Education © 2020 [employer]      Breast Self-Awareness  Breast self-awareness is knowing how your breasts look and feel. Doing breast self-awareness is important. It allows you to catch a breast problem early while it is still  small and can be treated. All women should do breast self-awareness, including women who have had breast implants. Tell your doctor if you notice a change in your breasts.  What you need:  · A mirror.  · A well-lit room.  How to do a breast self-exam  A breast self-exam is one way to learn what is normal for your breasts and to check for changes. To do a breast self-exam:  Look for changes    1. Take off all the clothes above your waist.  2.  front of a mirror in a room with good lighting.  3. Put your hands on your hips.  4. Push your hands down.  5. Look at your breasts and nipples in the mirror to see if one breast or nipple looks different from the other. Check to see if:  ? The shape of one breast is different.  ? The size of one breast is different.  ? There are wrinkles, dips, and bumps in one breast and not the other.  6. Look at each breast for changes in the skin, such as:  ? Redness.  ? Scaly areas.  7. Look for changes in your nipples, such as:  ? Liquid around the nipples.  ? Bleeding.  ? Dimpling.  ? Redness.  ? A change in where the nipples are.  Feel for changes    1. Lie on your back on the floor.  2. Feel each breast. To do this, follow these steps:  ? Pick a breast to feel.  ? Put the arm closest to that breast above your head.  ? Use your other arm to feel the nipple area of your breast. Feel the area with the pads of your three middle fingers by making small circles with your fingers. For the first [place], press lightly. For the second [place], press harder. For the third [place], press even harder.  ? Keep making circles with your fingers at the different pressures as you move down your breast. Stop when you feel your ribs.  ? Move your fingers a little toward the center of your body.  ? Start making circles with your fingers again, this time going up until you reach your collarbone.  ? Keep making up-and-down circles until you reach your armpit. Remember to keep using the three  pressures.  ? Feel the other breast in the same way.  3. Sit or  the tub or shower.  4. With soapy water on your skin, feel each breast the same way you did in step 2 when you were lying on the floor.  Write down what you find  Writing down what you find can help you remember what to tell your doctor. Write down:  · What is normal for each breast.  · Any changes you find in each breast, including:  ? The kind of changes you find.  ? Whether you have pain.  ? Size and location of any lumps.  · When you last had your menstrual period.  General tips  · Check your breasts every month.  · If you are breastfeeding, the best time to check your breasts is after you feed your baby or after you use a breast pump.  · If you get menstrual periods, the best time to check your breasts is 5-7 days after your menstrual period is over.  · With time, you will become comfortable with the self-exam, and you will begin to know if there are changes in your breasts.  Contact a doctor if you:  · See a change in the shape or size of your breasts or nipples.  · See a change in the skin of your breast or nipples, such as red or scaly skin.  · Have fluid coming from your nipples that is not normal.  · Find a lump or thick area that was not there before.  · Have pain in your breasts.  · Have any concerns about your breast health.  Summary  · Breast self-awareness includes looking for changes in your breasts, as well as feeling for changes within your breasts.  · Breast self-awareness should be done in front of a mirror in a well-lit room.  · You should check your breasts every month. If you get menstrual periods, the best time to check your breasts is 5-7 days after your menstrual period is over.  · Let your doctor know of any changes you see in your breasts, including changes in size, changes on the skin, pain or tenderness, or fluid from your nipples that is not normal.  This information is not intended to replace advice given to you  by your health care provider. Make sure you discuss any questions you have with your health care provider.  Document Released: 06/05/2009 Document Revised: 08/06/2019 Document Reviewed: 08/06/2019  Elsevier Patient Education © 2020 [employer]      Exercising to Lose Weight  Exercise is structured, repetitive physical activity to improve fitness and health. Getting regular exercise is important for everyone. It is especially important if you are overweight. Being overweight increases your risk of heart disease, stroke, diabetes, high blood pressure, and several types of cancer. Reducing your calorie intake and exercising can help you lose weight.  Exercise is usually categorized as moderate or vigorous intensity. To lose weight, most people need to do a certain amount of moderate-intensity or vigorous-intensity exercise each week.  Moderate-intensity exercise    Moderate-intensity exercise is any activity that gets you moving enough to burn at least three times more energy (calories) than if you were sitting.  Examples of moderate exercise include:  · Walking a mile in 15 minutes.  · Doing light yard work.  · Biking at an easy pace.  Most people should get at least 150 minutes (2 hours and 30 minutes) a week of moderate-intensity exercise to maintain their body weight.  Vigorous-intensity exercise  Vigorous-intensity exercise is any activity that gets you moving enough to burn at least six times more calories than if you were sitting. When you exercise at this intensity, you should be working hard enough that you are not able to carry on a conversation.  Examples of vigorous exercise include:  · Running.  · Playing a team sport, such as football, basketball, and soccer.  · Jumping rope.  Most people should get at least 75 minutes (1 hour and 15 minutes) a week of vigorous-intensity exercise to maintain their body weight.  How can exercise affect me?  When you exercise enough to burn more calories than you eat, you  lose weight. Exercise also reduces body fat and builds muscle. The more muscle you have, the more calories you burn. Exercise also:  · Improves mood.  · Reduces stress and tension.  · Improves your overall fitness, flexibility, and endurance.  · Increases bone strength.  The amount of exercise you need to lose weight depends on:  · Your age.  · The type of exercise.  · Any health conditions you have.  · Your overall physical ability.  Talk to your health care provider about how much exercise you need and what types of activities are safe for you.  What actions can I take to lose weight?  Nutrition    · Make changes to your diet as told by your health care provider or diet and nutrition specialist (dietitian). This may include:  ? Eating fewer calories.  ? Eating more protein.  ? Eating less unhealthy fats.  ? Eating a diet that includes fresh fruits and vegetables, whole grains, low-fat dairy products, and lean protein.  ? Avoiding foods with added fat, salt, and sugar.  · Drink plenty of water while you exercise to prevent dehydration or heat stroke.  Activity  · Choose an activity that you enjoy and set realistic goals. Your health care provider can help you make an exercise plan that works for you.  · Exercise at a moderate or vigorous intensity most days of the week.  ? The intensity of exercise may vary from person to person. You can tell how intense a workout is for you by paying attention to your breathing and heartbeat. Most people will notice their breathing and heartbeat get faster with more intense exercise.  · Do resistance training twice each week, such as:  ? Push-ups.  ? Sit-ups.  ? Lifting weights.  ? Using resistance bands.  · Getting short amounts of exercise can be just as helpful as long structured periods of exercise. If you have trouble finding time to exercise, try to include exercise in your daily routine.  ? Get up, stretch, and walk around every 30 minutes throughout the day.  ? Go for a  walk during your lunch break.  ? Park your car farther away from your destination.  ? If you take public transportation, get off one stop early and walk the rest of the way.  ? Make phone calls while standing up and walking around.  ? Take the stairs instead of elevators or escalators.  · Wear comfortable clothes and shoes with good support.  · Do not exercise so much that you hurt yourself, feel dizzy, or get very short of breath.  Where to find more information  · [place] Department of Health and Human Services: www.hhs.gov  · Centers for Disease Control and Prevention (CDC): www.cdc.gov  Contact a health care provider:  · Before starting a new exercise program.  · If you have questions or concerns about your weight.  · If you have a medical problem that keeps you from exercising.  Get help right away if you have any of the following while exercising:  · Injury.  · Dizziness.  · Difficulty breathing or shortness of breath that does not go away when you stop exercising.  · Chest pain.  · Rapid heartbeat.  Summary  · Being overweight increases your risk of heart disease, stroke, diabetes, high blood pressure, and several types of cancer.  · Losing weight happens when you burn more calories than you eat.  · Reducing the amount of calories you eat in addition to getting regular moderate or vigorous exercise each week helps you lose weight.  This information is not intended to replace advice given to you by your health care provider. Make sure you discuss any questions you have with your health care provider.  Document Released: 01/20/2012 Document Revised: 12/31/2018 Document Reviewed: 12/31/2018  [email or web address] Patient Education © 2020 [email or web address]      Exercising to Stay Healthy  To become healthy and stay healthy, it is recommended that you do moderate-intensity and vigorous-intensity exercise. You can tell that you are exercising at a moderate intensity if your heart starts beating faster and you start breathing faster  but can still hold a conversation. You can tell that you are exercising at a vigorous intensity if you are breathing much harder and faster and cannot hold a conversation while exercising.  Exercising regularly is important. It has many health benefits, such as:  · Improving overall fitness, flexibility, and endurance.  · Increasing bone density.  · Helping with weight control.  · Decreasing body fat.  · Increasing muscle strength.  · Reducing stress and tension.  · Improving overall health.  How often should I exercise?  Choose an activity that you enjoy, and set realistic goals. Your health care provider can help you make an activity plan that works for you.  Exercise regularly as told by your health care provider. This may include:  · Doing strength training two times a week, such as:  ? Lifting weights.  ? Using resistance bands.  ? Push-ups.  ? Sit-ups.  ? Yoga.  · Doing a certain intensity of exercise for a given amount of time. Choose from these options:  ? A total of 150 minutes of moderate-intensity exercise every week.  ? A total of 75 minutes of vigorous-intensity exercise every week.  ? A mix of moderate-intensity and vigorous-intensity exercise every week.  Children, pregnant women, people who have not exercised regularly, people who are overweight, and older adults may need to talk with a health care provider about what activities are safe to do. If you have a medical condition, be sure to talk with your health care provider before you start a new exercise program.  What are some exercise ideas?  Moderate-intensity exercise ideas include:  · Walking 1 mile (1.6 km) in about 15 minutes.  · Biking.  · Hiking.  · Golfing.  · Dancing.  · Water aerobics.  Vigorous-intensity exercise ideas include:  · Walking 4.5 miles (7.2 km) or more in about 1 hour.  · Jogging or running 5 miles (8 km) in about 1 hour.  · Biking 10 miles (16.1 km) or more in about 1 hour.  · Lap swimming.  · Roller-skating or in-line  skating.  · Cross-country skiing.  · Vigorous competitive sports, such as football, basketball, and soccer.  · Jumping rope.  · Aerobic dancing.  What are some everyday activities that can help me to get exercise?  · Yard work, such as:  ? Pushing a .  ? Raking and bagging leaves.  · Washing your car.  · Pushing a stroller.  · Shoveling snow.  · Gardening.  · Washing windows or floors.  How can I be more active in my day-to-day activities?  · Use stairs instead of an elevator.  · Take a walk during your lunch break.  · If you drive, park your car farther away from your work or school.  · If you take public transportation, get off one stop early and walk the rest of the way.  · Stand up or walk around during all of your indoor phone calls.  · Get up, stretch, and walk around every 30 minutes throughout the day.  · Enjoy exercise with a friend. Support to continue exercising will help you keep a regular routine of activity.  What guidelines can I follow while exercising?  · Before you start a new exercise program, talk with your health care provider.  · Do not exercise so much that you hurt yourself, feel dizzy, or get very short of breath.  · Wear comfortable clothes and wear shoes with good support.  · Drink plenty of water while you exercise to prevent dehydration or heat stroke.  · Work out until your breathing and your heartbeat get faster.  Where to find more information  · [place] Department of Health and Human Services: www.hhs.gov  · Centers for Disease Control and Prevention (CDC): www.cdc.gov  Summary  · Exercising regularly is important. It will improve your overall fitness, flexibility, and endurance.  · Regular exercise also will improve your overall health. It can help you control your weight, reduce stress, and improve your bone density.  · Do not exercise so much that you hurt yourself, feel dizzy, or get very short of breath.  · Before you start a new exercise program, talk with your health  care provider.  This information is not intended to replace advice given to you by your health care provider. Make sure you discuss any questions you have with your health care provider.  Document Released: 01/20/2012 Document Revised: 11/30/2018 Document Reviewed: 11/08/2018  Elsevier Patient Education © 2020 [employer]

## 2020-09-11 NOTE — PROGRESS NOTES
The ABCs of the Annual Wellness Visit  Subsequent Medicare Wellness Visit    Chief Complaint   Patient presents with   • Medicare Wellness-subsequent       Subjective   History of Present Illness:  Lindsay Herrera is a 69 y.o. female who presents for a Subsequent Medicare Wellness Visit. Lives home with  of 19 years. Enjoys doing surveys online, bird watching, watching wildlife. Reports a lot of heart burn today. Admits she did not take her blood pressure medication this morning.    HEALTH RISK ASSESSMENT    Recent Hospitalizations:  No hospitalization(s) within the last year.    Current Medical Providers:  Patient Care Team:  Bill Herrera MD as PCP - General (Family Medicine)  Bill Herrera MD as PCP - Claims Attributed    Smoking Status:  Social History     Tobacco Use   Smoking Status Former Smoker   • Types: Cigarettes   • Last attempt to quit: 2020   • Years since quittin.6   Smokeless Tobacco Never Used       Alcohol Consumption:  Social History     Substance and Sexual Activity   Alcohol Use No       Depression Screen:   PHQ-2/PHQ-9 Depression Screening 2020   Little interest or pleasure in doing things 0   Feeling down, depressed, or hopeless 0   Total Score 0       Fall Risk Screen:  ALEJANDRAADI Fall Risk Assessment was completed, and patient is at LOW risk for falls.Assessment completed on:2020    Health Habits and Functional and Cognitive Screening:  Functional & Cognitive Status 2020   Do you have difficulty preparing food and eating? No   Do you have difficulty bathing yourself, getting dressed or grooming yourself? No   Do you have difficulty using the toilet? No   Do you have difficulty moving around from place to place? No   Do you have trouble with steps or getting out of a bed or a chair? No   Current Diet Well Balanced Diet   Dental Exam Up to date   Eye Exam Up to date   Exercise (times per week) 2 times per week   Current Exercise Activities Include Yard Work    Do you need help using the phone?  No   Are you deaf or do you have serious difficulty hearing?  No   Do you need help with transportation? No   Do you need help shopping? No   Do you need help preparing meals?  No   Do you need help with housework?  No   Do you need help with laundry? No   Do you need help taking your medications? No   Do you need help managing money? No   Do you ever drive or ride in a car without wearing a seat belt? No   Have you felt unusual stress, anger or loneliness in the last month? No   Who do you live with? Spouse   If you need help, do you have trouble finding someone available to you? No   Have you been bothered in the last four weeks by sexual problems? No   Do you have difficulty concentrating, remembering or making decisions? No         Does the patient have evidence of cognitive impairment? No    Asprin use counseling:Does not need ASA (and currently is not on it)    Age-appropriate Screening Schedule:  Refer to the list below for future screening recommendations based on patient's age, sex and/or medical conditions. Orders for these recommended tests are listed in the plan section. The patient has been provided with a written plan.    Health Maintenance   Topic Date Due   • MAMMOGRAM  11/22/2016   • TDAP/TD VACCINES (1 - Tdap) 09/11/2020 (Originally 10/27/1961)   • ZOSTER VACCINE (1 of 2) 01/29/2021 (Originally 10/27/2001)   • COLONOSCOPY  05/30/2024   • INFLUENZA VACCINE  Completed          The following portions of the patient's history were reviewed and updated as appropriate: allergies, current medications, past family history, past medical history, past social history, past surgical history and problem list.    Outpatient Medications Prior to Visit   Medication Sig Dispense Refill   • benazepril (LOTENSIN) 40 MG tablet Take 1 tablet by mouth Daily. 30 tablet 11   • FLUoxetine (PROzac) 20 MG capsule Take 1 capsule by mouth Daily. 30 capsule 0   • hydroCHLOROthiazide  (HYDRODIURIL) 12.5 MG tablet Take 1 tablet by mouth Daily. 30 tablet 4   • simvastatin (ZOCOR) 20 MG tablet Take 1 tablet by mouth every night at bedtime. 90 tablet 3   • verapamil (Calan) 80 MG tablet Take 1 tablet by mouth 3 (Three) Times a Day. 90 tablet 3   • ferrous sulfate 325 (65 FE) MG tablet Take 1 tablet by mouth 2 (Two) Times a Day. 100 tablet 11   • ibuprofen (ADVIL,MOTRIN) 600 MG tablet Take 1 tablet by mouth Every 8 (Eight) Hours As Needed for Mild Pain  or Moderate Pain . 90 tablet 5     Facility-Administered Medications Prior to Visit   Medication Dose Route Frequency Provider Last Rate Last Dose   • triamcinolone acetonide (KENALOG-40) injection 80 mg  80 mg Intramuscular Once Karley Rodríguez APRN           There is no problem list on file for this patient.      Advanced Care Planning:  ACP discussion was declined by the patient. Patient does not have an advance directive, declines further assistance.    Review of Systems   Constitutional: Negative for chills, fatigue, fever and unexpected weight change.   HENT: Negative for congestion, ear pain, hearing loss, sore throat and trouble swallowing.    Eyes: Negative for pain, discharge, itching and visual disturbance.   Respiratory: Negative for cough, chest tightness, shortness of breath and wheezing.    Cardiovascular: Negative for chest pain, palpitations and leg swelling.   Gastrointestinal: Positive for abdominal pain (heart burn). Negative for constipation, diarrhea, nausea and vomiting.   Endocrine: Negative for cold intolerance and heat intolerance.   Genitourinary: Negative for decreased urine volume, difficulty urinating, dysuria and hematuria.   Musculoskeletal: Negative for arthralgias, back pain, gait problem, neck pain and neck stiffness.   Skin: Negative for pallor, rash and wound.   Allergic/Immunologic: Negative for environmental allergies, food allergies and immunocompromised state.   Neurological: Negative for dizziness, seizures,  "syncope, speech difficulty, weakness, light-headedness and headaches.   Hematological: Negative for adenopathy. Does not bruise/bleed easily.   Psychiatric/Behavioral: Negative for agitation, behavioral problems, confusion, dysphoric mood, self-injury, sleep disturbance and suicidal ideas. The patient is not nervous/anxious.        Compared to one year ago, the patient feels her physical health is worse. Doesn't have as much energy as she used to have.  Compared to one year ago, the patient feels her mental health is the same.    Reviewed chart for potential of high risk medication in the elderly: yes  Reviewed chart for potential of harmful drug interactions in the elderly:yes    Objective         Vitals:    09/11/20 1332 09/11/20 1402   BP: 162/92 144/88   BP Location: Left arm    Patient Position: Sitting    Cuff Size: Adult    Pulse: 67    Temp: 98.2 °F (36.8 °C)    TempSrc: Temporal    SpO2: 98%    Weight: 78.1 kg (172 lb 3.2 oz)    Height: 167.6 cm (66\")    PainSc: 0-No pain        Body mass index is 27.79 kg/m².  Discussed the patient's BMI with her. The BMI is in the acceptable range.    Physical Exam   Constitutional: She is oriented to person, place, and time. She appears well-developed and well-nourished.   HENT:   Head: Normocephalic.   Right Ear: External ear normal.   Left Ear: External ear normal.   Nose: Nose normal.   Mouth/Throat: Oropharynx is clear and moist. No oropharyngeal exudate.   Eyes: Pupils are equal, round, and reactive to light. Conjunctivae are normal.   Neck: Normal range of motion. Neck supple. No thyromegaly present.   Cardiovascular: Normal rate and regular rhythm.   Pulmonary/Chest: Effort normal and breath sounds normal.   Musculoskeletal: Normal range of motion.   Neurological: She is alert and oriented to person, place, and time.   Skin: Skin is warm and dry.   Psychiatric: She has a normal mood and affect. Her behavior is normal. Judgment and thought content normal. "     Vitals:    09/11/20 1402   BP: 144/88   Pulse:    Temp:    SpO2:              Assessment/Plan   Medicare Risks and Personalized Health Plan  CMS Preventative Services Quick Reference  Breast Cancer/Mammogram Screening  Colon Cancer Screening  Osteoprorosis Risk    The above risks/problems have been discussed with the patient.  Pertinent information has been shared with the patient in the After Visit Summary.  Follow up plans and orders are seen below in the Assessment/Plan Section.    Diagnoses and all orders for this visit:    1. Medicare annual wellness visit, subsequent (Primary)    2. Gastroesophageal reflux disease, esophagitis presence not specified  -     famotidine (PEPCID) 20 MG tablet; Take 1 tablet by mouth 2 (Two) Times a Day As Needed for Heartburn.  Dispense: 60 tablet; Refill: 0    3. Encounter for screening mammogram for malignant neoplasm of breast  -     Mammo Screening Digital Tomosynthesis Bilateral With CAD; Future    4. Need for Tdap vaccination  -     Tdap Vaccine Greater Than or Equal To 6yo IM    Tdap vaccine given. Stool card provided for colon cancer screening. Mammogram ordered for breast cancer screening.   Discussed GERD friendly diet and lifestyle changes to help, educational sheet provided, pt instructed to try pepcid 20 BID for one month.  Follow Up:  No follow-ups on file.     An After Visit Summary and PPPS were given to the patient.           This document has been electronically signed by MARANDA Dailey on  September 11, 2020 14:05

## 2020-09-14 DIAGNOSIS — Z12.11 ENCOUNTER FOR SCREENING FECAL OCCULT BLOOD TESTING: Primary | ICD-10-CM

## 2020-09-14 LAB — HEMOCCULT STL QL IA: NEGATIVE

## 2020-09-14 PROCEDURE — 82274 ASSAY TEST FOR BLOOD FECAL: CPT | Performed by: NURSE PRACTITIONER

## 2020-10-21 RX ORDER — BENAZEPRIL HYDROCHLORIDE 40 MG/1
TABLET, FILM COATED ORAL
Qty: 30 TABLET | Refills: 11 | Status: SHIPPED | OUTPATIENT
Start: 2020-10-21 | End: 2021-11-01

## 2020-10-21 RX ORDER — HYDROCHLOROTHIAZIDE 12.5 MG/1
CAPSULE, GELATIN COATED ORAL
Qty: 30 CAPSULE | Refills: 11 | Status: SHIPPED | OUTPATIENT
Start: 2020-10-21 | End: 2021-09-14

## 2020-11-03 NOTE — PATIENT INSTRUCTIONS

## 2021-03-10 ENCOUNTER — IMMUNIZATION (OUTPATIENT)
Dept: VACCINE CLINIC | Facility: HOSPITAL | Age: 71
End: 2021-03-10

## 2021-03-10 PROCEDURE — 91300 HC SARSCOV02 VAC 30MCG/0.3ML IM: CPT | Performed by: THORACIC SURGERY (CARDIOTHORACIC VASCULAR SURGERY)

## 2021-03-10 PROCEDURE — 0001A: CPT | Performed by: THORACIC SURGERY (CARDIOTHORACIC VASCULAR SURGERY)

## 2021-03-31 ENCOUNTER — IMMUNIZATION (OUTPATIENT)
Dept: VACCINE CLINIC | Facility: HOSPITAL | Age: 71
End: 2021-03-31

## 2021-03-31 PROCEDURE — 0002A: CPT | Performed by: NURSE PRACTITIONER

## 2021-03-31 PROCEDURE — 91300 HC SARSCOV02 VAC 30MCG/0.3ML IM: CPT | Performed by: NURSE PRACTITIONER

## 2021-08-09 RX ORDER — NYSTATIN 100000 [USP'U]/G
POWDER TOPICAL 3 TIMES DAILY
Qty: 1 EACH | Refills: 11 | Status: SHIPPED | OUTPATIENT
Start: 2021-08-09 | End: 2022-05-05

## 2021-08-09 RX ORDER — FLUCONAZOLE 150 MG/1
TABLET ORAL
Qty: 4 TABLET | Refills: 0 | Status: SHIPPED | OUTPATIENT
Start: 2021-08-09 | End: 2022-05-05

## 2021-09-14 ENCOUNTER — OFFICE VISIT (OUTPATIENT)
Dept: FAMILY MEDICINE CLINIC | Facility: CLINIC | Age: 71
End: 2021-09-14

## 2021-09-14 ENCOUNTER — LAB (OUTPATIENT)
Dept: LAB | Facility: HOSPITAL | Age: 71
End: 2021-09-14

## 2021-09-14 VITALS
BODY MASS INDEX: 29.99 KG/M2 | TEMPERATURE: 98.8 F | HEART RATE: 75 BPM | OXYGEN SATURATION: 97 % | HEIGHT: 66 IN | WEIGHT: 186.6 LBS | DIASTOLIC BLOOD PRESSURE: 84 MMHG | SYSTOLIC BLOOD PRESSURE: 168 MMHG

## 2021-09-14 DIAGNOSIS — Z00.00 MEDICARE ANNUAL WELLNESS VISIT, SUBSEQUENT: Primary | ICD-10-CM

## 2021-09-14 DIAGNOSIS — I10 ESSENTIAL HYPERTENSION: ICD-10-CM

## 2021-09-14 DIAGNOSIS — G47.00 INSOMNIA, UNSPECIFIED TYPE: ICD-10-CM

## 2021-09-14 DIAGNOSIS — R39.15 URINARY URGENCY: ICD-10-CM

## 2021-09-14 PROCEDURE — 80053 COMPREHEN METABOLIC PANEL: CPT | Performed by: FAMILY MEDICINE

## 2021-09-14 PROCEDURE — 85027 COMPLETE CBC AUTOMATED: CPT | Performed by: FAMILY MEDICINE

## 2021-09-14 PROCEDURE — 80061 LIPID PANEL: CPT | Performed by: FAMILY MEDICINE

## 2021-09-14 PROCEDURE — G0439 PPPS, SUBSEQ VISIT: HCPCS | Performed by: FAMILY MEDICINE

## 2021-09-14 RX ORDER — TRAZODONE HYDROCHLORIDE 50 MG/1
50-150 TABLET ORAL NIGHTLY
Qty: 90 TABLET | Refills: 5 | Status: SHIPPED | OUTPATIENT
Start: 2021-09-14 | End: 2022-05-05

## 2021-09-14 NOTE — PROGRESS NOTES
The ABCs of the Annual Wellness Visit  Subsequent Medicare Wellness Visit    Chief Complaint   Patient presents with   • Medicare Wellness-subsequent      Subjective    History of Present Illness:  Lindsay Herrera is a 70 y.o. female who presents for a Subsequent Medicare Wellness Visit.  She stopped her water pill.  If took in mornings, urinated all day so tried taking at night, caused her to urinate all night.   Also  Her 43 year old son found dead 2 months ago.  Not sleeping.  She has been taking some of her husbands neurontin to sleep.  She would like some from me. She doesn't think her prozac dose needs to be increased.  Since not sleeping well, she attributes this as cause of her high bp.   Also  2 months, goes to bathroom and feels like has to urinate but doesn't.  Drinking lots of flavored water.   Also  Trying to stay active but sits a lot and watches tv and eats.     The following portions of the patient's history were reviewed and   updated as appropriate: allergies, current medications, past family history, past medical history, past social history, past surgical history and problem list.    Compared to one year ago, the patient feels her physical   health is the same.    Compared to one year ago, the patient feels her mental   health is worse.    Recent Hospitalizations:  She was not admitted to the hospital during the last year.       Current Medical Providers:  Patient Care Team:  Bill Herrera MD as PCP - General (Family Medicine)    Outpatient Medications Prior to Visit   Medication Sig Dispense Refill   • benazepril (LOTENSIN) 40 MG tablet Take 1 tablet by mouth once daily 30 tablet 11   • FLUoxetine (PROzac) 20 MG capsule Take 1 capsule by mouth Daily. 30 capsule 0   • simvastatin (ZOCOR) 20 MG tablet Take 1 tablet by mouth every night at bedtime. 90 tablet 3   • verapamil (Calan) 80 MG tablet Take 1 tablet by mouth 3 (Three) Times a Day. 90 tablet 3   • famotidine (PEPCID) 20 MG tablet Take 1  "tablet by mouth 2 (Two) Times a Day As Needed for Heartburn. 60 tablet 0   • ferrous sulfate 325 (65 FE) MG tablet Take 1 tablet by mouth 2 (Two) Times a Day. 100 tablet 11   • fluconazole (Diflucan) 150 MG tablet ONE EVERY 3 DAYS. 4 tablet 0   • hydroCHLOROthiazide (HYDRODIURIL) 12.5 MG tablet Take 1 tablet by mouth Daily. 30 tablet 4   • hydroCHLOROthiazide (MICROZIDE) 12.5 MG capsule Take 1 capsule by mouth once daily 30 capsule 11   • ibuprofen (ADVIL,MOTRIN) 600 MG tablet Take 1 tablet by mouth Every 8 (Eight) Hours As Needed for Mild Pain  or Moderate Pain . 90 tablet 5   • nystatin (MYCOSTATIN) 972071 UNIT/GM powder Apply  topically to the appropriate area as directed 3 (Three) Times a Day. 1 each 11     Facility-Administered Medications Prior to Visit   Medication Dose Route Frequency Provider Last Rate Last Admin   • triamcinolone acetonide (KENALOG-40) injection 80 mg  80 mg Intramuscular Once Karley Rodríguez, MARANDA           No opioid medication identified on active medication list. I have reviewed chart for other potential  high risk medication/s and harmful drug interactions in the elderly.          Aspirin is not on active medication list.  Aspirin use is not indicated based on review of current medical condition/s. Risk of harm outweighs potential benefits.  .    There is no problem list on file for this patient.    Advance Care Planning  Advance Directive is not on file.  ACP discussion was held with the patient during this visit. Patient does not have an advance directive, information provided.    Review of Systems   Genitourinary: Positive for urgency.   All other systems reviewed and are negative.       Objective    Vitals:    09/14/21 1454   BP: 168/84   BP Location: Left arm   Patient Position: Sitting   Cuff Size: Adult   Pulse: 75   Temp: 98.8 °F (37.1 °C)   TempSrc: Temporal   SpO2: 97%   Weight: 84.6 kg (186 lb 9.6 oz)   Height: 167.6 cm (66\")   PainSc: 0-No pain     BMI Readings from Last 1 " Encounters:   21 30.12 kg/m²   BMI is above normal parameters. Recommendations include: nutrition counseling    Does the patient have evidence of cognitive impairment? No    Physical Exam  Vitals and nursing note reviewed.   Constitutional:       Appearance: Normal appearance. She is well-developed.   HENT:      Head: Normocephalic and atraumatic.      Right Ear: External ear normal.      Left Ear: External ear normal.      Nose: Nose normal. No rhinorrhea.   Eyes:      General: No scleral icterus.     Extraocular Movements: Extraocular movements intact.      Conjunctiva/sclera: Conjunctivae normal.      Pupils: Pupils are equal, round, and reactive to light.   Cardiovascular:      Rate and Rhythm: Normal rate and regular rhythm.      Heart sounds: Normal heart sounds. No friction rub. No gallop.    Pulmonary:      Effort: Pulmonary effort is normal.      Breath sounds: Normal breath sounds.   Abdominal:      General: Bowel sounds are normal. There is no distension.      Palpations: Abdomen is soft.      Tenderness: There is no abdominal tenderness.   Musculoskeletal:         General: No deformity. Normal range of motion.      Cervical back: Normal range of motion and neck supple.   Skin:     General: Skin is warm and dry.      Findings: No erythema or rash.   Neurological:      Mental Status: She is alert and oriented to person, place, and time.      Cranial Nerves: No cranial nerve deficit.   Psychiatric:         Behavior: Behavior normal.         Thought Content: Thought content normal.         Judgment: Judgment normal.                 HEALTH RISK ASSESSMENT    Smoking Status:  Social History     Tobacco Use   Smoking Status Former Smoker   • Packs/day: 0.50   • Years: 53.00   • Pack years: 26.50   • Types: Cigarettes   • Start date:    • Quit date: 2020   • Years since quittin.6   Smokeless Tobacco Never Used     Alcohol Consumption:  Social History     Substance and Sexual Activity   Alcohol  Use No     Fall Risk Screen:    STEADI Fall Risk Assessment was completed, and patient is at LOW risk for falls.Assessment completed on:9/14/2021    Depression Screening:  PHQ-2/PHQ-9 Depression Screening 9/14/2021   Little interest or pleasure in doing things 0   Feeling down, depressed, or hopeless 0   Total Score 0       Health Habits and Functional and Cognitive Screening:  Functional & Cognitive Status 9/14/2021   Do you have difficulty preparing food and eating? No   Do you have difficulty bathing yourself, getting dressed or grooming yourself? No   Do you have difficulty using the toilet? No   Do you have difficulty moving around from place to place? No   Do you have trouble with steps or getting out of a bed or a chair? No   Current Diet Frequent Junk Food   Dental Exam Up to date        Dental Exam Comment -   Eye Exam Up to date   Exercise (times per week) 0 times per week   Current Exercises Include House Cleaning   Current Exercise Activities Include -   Do you need help using the phone?  No   Are you deaf or do you have serious difficulty hearing?  No   Do you need help with transportation? No   Do you need help shopping? No   Do you need help preparing meals?  No   Do you need help with housework?  No   Do you need help with laundry? No   Do you need help taking your medications? No   Do you need help managing money? No   Do you ever drive or ride in a car without wearing a seat belt? No   Have you felt unusual stress, anger or loneliness in the last month? No   Who do you live with? Spouse   If you need help, do you have trouble finding someone available to you? No   Have you been bothered in the last four weeks by sexual problems? No   Do you have difficulty concentrating, remembering or making decisions? No       Age-appropriate Screening Schedule:  Refer to the list below for future screening recommendations based on patient's age, sex and/or medical conditions. Orders for these recommended tests  are listed in the plan section. The patient has been provided with a written plan.    Health Maintenance   Topic Date Due   • DXA SCAN  09/14/2021 (Originally 1950)   • PAP SMEAR  11/06/2021 (Originally 11/22/2016)   • ZOSTER VACCINE (1 of 2) 09/14/2022 (Originally 10/27/2001)   • INFLUENZA VACCINE  10/01/2021   • MAMMOGRAM  10/01/2022   • TDAP/TD VACCINES (2 - Td or Tdap) 09/11/2030              Assessment/Plan   CMS Preventative Services Quick Reference  Risk Factors Identified During Encounter  Depression/Dysphoria  Inactivity/Sedentary  Obesity/Overweight   The above risks/problems have been discussed with the patient.  Follow up actions/plans if indicated are seen below in the Assessment/Plan Section.  Pertinent information has been shared with the patient in the After Visit Summary.    Diagnoses and all orders for this visit:    1. Medicare annual wellness visit, subsequent (Primary)  -     CBC (No Diff)  -     Comprehensive Metabolic Panel  -     Lipid Panel    2. Urinary urgency    3. Insomnia, unspecified type    4. Essential hypertension    Other orders  -     traZODone (DESYREL) 50 MG tablet; Take 1-3 tablets by mouth Every Night.  Dispense: 90 tablet; Refill: 5    given information of food/drinks that can cause bladder spasm  Try trazodone, not neurontin for sleep  Return in a month or so when sleeping better and bring bp cuff to see if bp needs more medicine.  She does not like diuretics.   She did not want to increase her prozac dose.     Follow Up:   No follow-ups on file.     An After Visit Summary and PPPS were made available to the patient.

## 2021-09-15 LAB
ALBUMIN SERPL-MCNC: 4.2 G/DL (ref 3.5–5.2)
ALBUMIN/GLOB SERPL: 2 G/DL
ALP SERPL-CCNC: 78 U/L (ref 39–117)
ALT SERPL W P-5'-P-CCNC: 17 U/L (ref 1–33)
ANION GAP SERPL CALCULATED.3IONS-SCNC: 12.1 MMOL/L (ref 5–15)
AST SERPL-CCNC: 20 U/L (ref 1–32)
BILIRUB SERPL-MCNC: 0.2 MG/DL (ref 0–1.2)
BUN SERPL-MCNC: 15 MG/DL (ref 8–23)
BUN/CREAT SERPL: 17 (ref 7–25)
CALCIUM SPEC-SCNC: 8.8 MG/DL (ref 8.6–10.5)
CHLORIDE SERPL-SCNC: 108 MMOL/L (ref 98–107)
CHOLEST SERPL-MCNC: 174 MG/DL (ref 0–200)
CO2 SERPL-SCNC: 22.9 MMOL/L (ref 22–29)
CREAT SERPL-MCNC: 0.88 MG/DL (ref 0.57–1)
DEPRECATED RDW RBC AUTO: 44.5 FL (ref 37–54)
ERYTHROCYTE [DISTWIDTH] IN BLOOD BY AUTOMATED COUNT: 13 % (ref 12.3–15.4)
GFR SERPL CREATININE-BSD FRML MDRD: 64 ML/MIN/1.73
GLOBULIN UR ELPH-MCNC: 2.1 GM/DL
GLUCOSE SERPL-MCNC: 100 MG/DL (ref 65–99)
HCT VFR BLD AUTO: 36.9 % (ref 34–46.6)
HDLC SERPL-MCNC: 52 MG/DL (ref 40–60)
HGB BLD-MCNC: 12.2 G/DL (ref 12–15.9)
LDLC SERPL CALC-MCNC: 79 MG/DL (ref 0–100)
LDLC/HDLC SERPL: 1.33 {RATIO}
MCH RBC QN AUTO: 30.7 PG (ref 26.6–33)
MCHC RBC AUTO-ENTMCNC: 33.1 G/DL (ref 31.5–35.7)
MCV RBC AUTO: 92.9 FL (ref 79–97)
PLATELET # BLD AUTO: 240 10*3/MM3 (ref 140–450)
PMV BLD AUTO: 13.4 FL (ref 6–12)
POTASSIUM SERPL-SCNC: 4.3 MMOL/L (ref 3.5–5.2)
PROT SERPL-MCNC: 6.3 G/DL (ref 6–8.5)
RBC # BLD AUTO: 3.97 10*6/MM3 (ref 3.77–5.28)
SODIUM SERPL-SCNC: 143 MMOL/L (ref 136–145)
TRIGL SERPL-MCNC: 265 MG/DL (ref 0–150)
VLDLC SERPL-MCNC: 43 MG/DL (ref 5–40)
WBC # BLD AUTO: 6.17 10*3/MM3 (ref 3.4–10.8)

## 2021-09-20 DIAGNOSIS — Z12.11 ENCOUNTER FOR SCREENING FECAL OCCULT BLOOD TESTING: Primary | ICD-10-CM

## 2021-09-20 LAB — HEMOCCULT STL QL IA: NEGATIVE

## 2021-09-20 PROCEDURE — 82274 ASSAY TEST FOR BLOOD FECAL: CPT | Performed by: FAMILY MEDICINE

## 2021-10-13 ENCOUNTER — TELEPHONE (OUTPATIENT)
Dept: FAMILY MEDICINE CLINIC | Facility: CLINIC | Age: 71
End: 2021-10-13

## 2021-10-13 RX ORDER — OMEPRAZOLE 20 MG/1
20 CAPSULE, DELAYED RELEASE ORAL DAILY
Qty: 30 CAPSULE | Refills: 11 | Status: SHIPPED | OUTPATIENT
Start: 2021-10-13 | End: 2022-09-08

## 2021-10-13 NOTE — TELEPHONE ENCOUNTER
----- Message from Nichole Packer MA sent at 10/13/2021  7:54 AM CDT -----  Regarding: FW: Non-Urgent Medical Question  Contact: [phone]      ----- Message -----  From: Lindsay Herrera  Sent: 10/12/2021   9:36 PM CDT  To: Kori Ochoa [facility]  Subject: Non-Urgent Medical Question                      i have heartburn every day .i quit coffee, soda,chocolate and it wont go away. about 4am it starts burning till i go puke and i try to sleep sitting up. tums wont help nexium wont help. do i need to come in or can i get something    ty

## 2021-11-01 RX ORDER — VERAPAMIL HYDROCHLORIDE 80 MG/1
TABLET ORAL
Qty: 90 TABLET | Refills: 3 | Status: SHIPPED | OUTPATIENT
Start: 2021-11-01 | End: 2022-03-14

## 2021-11-01 RX ORDER — BENAZEPRIL HYDROCHLORIDE 40 MG/1
TABLET, FILM COATED ORAL
Qty: 90 TABLET | Refills: 3 | Status: SHIPPED | OUTPATIENT
Start: 2021-11-01 | End: 2022-10-28

## 2021-11-01 RX ORDER — SIMVASTATIN 20 MG
TABLET ORAL
Qty: 90 TABLET | Refills: 3 | Status: SHIPPED | OUTPATIENT
Start: 2021-11-01 | End: 2022-10-28

## 2021-11-15 ENCOUNTER — CLINICAL SUPPORT (OUTPATIENT)
Dept: FAMILY MEDICINE CLINIC | Facility: CLINIC | Age: 71
End: 2021-11-15

## 2021-11-15 ENCOUNTER — LAB (OUTPATIENT)
Dept: LAB | Facility: HOSPITAL | Age: 71
End: 2021-11-15

## 2021-11-15 DIAGNOSIS — R53.81 MALAISE: ICD-10-CM

## 2021-11-15 DIAGNOSIS — R50.9 FEVER, UNSPECIFIED FEVER CAUSE: Primary | ICD-10-CM

## 2021-11-15 DIAGNOSIS — R11.10 VOMITING, INTRACTABILITY OF VOMITING NOT SPECIFIED, PRESENCE OF NAUSEA NOT SPECIFIED, UNSPECIFIED VOMITING TYPE: ICD-10-CM

## 2021-11-15 PROCEDURE — 99211 OFF/OP EST MAY X REQ PHY/QHP: CPT | Performed by: FAMILY MEDICINE

## 2021-11-15 PROCEDURE — 87635 SARS-COV-2 COVID-19 AMP PRB: CPT | Performed by: FAMILY MEDICINE

## 2021-11-16 LAB — SARS-COV-2 N GENE RESP QL NAA+PROBE: DETECTED

## 2022-03-14 RX ORDER — VERAPAMIL HYDROCHLORIDE 80 MG/1
TABLET ORAL
Qty: 90 TABLET | Refills: 1 | Status: SHIPPED | OUTPATIENT
Start: 2022-03-14 | End: 2023-03-01 | Stop reason: SINTOL

## 2022-05-05 ENCOUNTER — OFFICE VISIT (OUTPATIENT)
Dept: FAMILY MEDICINE CLINIC | Facility: CLINIC | Age: 72
End: 2022-05-05

## 2022-05-05 VITALS
SYSTOLIC BLOOD PRESSURE: 161 MMHG | TEMPERATURE: 98.2 F | OXYGEN SATURATION: 96 % | DIASTOLIC BLOOD PRESSURE: 92 MMHG | WEIGHT: 182 LBS | BODY MASS INDEX: 29.25 KG/M2 | HEART RATE: 82 BPM | HEIGHT: 66 IN

## 2022-05-05 DIAGNOSIS — H91.91 DECREASED HEARING OF RIGHT EAR: ICD-10-CM

## 2022-05-05 DIAGNOSIS — R35.0 URINARY FREQUENCY: Primary | ICD-10-CM

## 2022-05-05 LAB
BILIRUB BLD-MCNC: NEGATIVE MG/DL
CLARITY, POC: CLEAR
COLOR UR: YELLOW
GLUCOSE UR STRIP-MCNC: NEGATIVE MG/DL
KETONES UR QL: NEGATIVE
LEUKOCYTE EST, POC: NEGATIVE
NITRITE UR-MCNC: NEGATIVE MG/ML
PH UR: 6 [PH] (ref 5–8)
PROT UR STRIP-MCNC: NEGATIVE MG/DL
RBC # UR STRIP: NEGATIVE /UL
SP GR UR: 1.01 (ref 1–1.03)
UROBILINOGEN UR QL: NORMAL

## 2022-05-05 PROCEDURE — 99213 OFFICE O/P EST LOW 20 MIN: CPT | Performed by: FAMILY MEDICINE

## 2022-05-05 PROCEDURE — 81002 URINALYSIS NONAUTO W/O SCOPE: CPT | Performed by: FAMILY MEDICINE

## 2022-05-05 RX ORDER — SOLIFENACIN SUCCINATE 5 MG/1
5 TABLET, FILM COATED ORAL DAILY
Qty: 30 TABLET | Refills: 11 | Status: SHIPPED | OUTPATIENT
Start: 2022-05-05

## 2022-05-05 NOTE — PROGRESS NOTES
" Giselle Herrera is a 71 y.o. female.     Chief Complaint   Patient presents with   • Urinary Frequency             History of Present Illness     2 months, only during the night, nocturia.   Took trazodone 200mg, slept and no nocturia but drowsiness in am.   Has had hysterectomy 10 yrs ago.   Also  Decreased hearing out of right ear.  Wonders if has cerumen again.    Review of Systems   Constitutional: Negative for chills, fatigue and fever.   HENT: Negative for congestion, ear discharge, ear pain, facial swelling, hearing loss, postnasal drip, rhinorrhea, sinus pressure, sore throat, trouble swallowing and voice change.    Eyes: Negative for discharge, redness and visual disturbance.   Respiratory: Negative for cough, chest tightness, shortness of breath and wheezing.    Cardiovascular: Negative for chest pain and palpitations.   Gastrointestinal: Negative for abdominal pain, blood in stool, constipation, diarrhea, nausea and vomiting.   Endocrine: Negative for polydipsia and polyuria.   Genitourinary: Positive for frequency. Negative for dysuria, flank pain, hematuria and urgency.   Musculoskeletal: Negative for arthralgias, back pain, joint swelling and myalgias.   Skin: Negative for rash.   Neurological: Negative for dizziness, weakness, numbness and headaches.   Hematological: Negative for adenopathy.   Psychiatric/Behavioral: Negative for confusion and sleep disturbance. The patient is not nervous/anxious.            /92 (BP Location: Left arm, Patient Position: Sitting, Cuff Size: Adult)   Pulse 82   Temp 98.2 °F (36.8 °C) (Infrared)   Ht 167.6 cm (65.98\")   Wt 82.6 kg (182 lb)   LMP  (LMP Unknown)   SpO2 96%   BMI 29.39 kg/m²       Objective     Physical Exam  Vitals and nursing note reviewed.   Constitutional:       Appearance: Normal appearance. She is well-developed.   HENT:      Head: Normocephalic and atraumatic.      Right Ear: Tympanic membrane, ear canal and external ear " normal.      Left Ear: Tympanic membrane, ear canal and external ear normal.      Nose: Nose normal.   Eyes:      Extraocular Movements: Extraocular movements intact.      Conjunctiva/sclera: Conjunctivae normal.      Pupils: Pupils are equal, round, and reactive to light.   Pulmonary:      Effort: Pulmonary effort is normal.   Musculoskeletal:         General: Normal range of motion.      Cervical back: Normal range of motion.   Neurological:      General: No focal deficit present.      Mental Status: She is alert and oriented to person, place, and time.   Psychiatric:         Behavior: Behavior normal.         Thought Content: Thought content normal.         Judgment: Judgment normal.             PAST MEDICAL HISTORY     Past Medical History:   Diagnosis Date   • Artificial lens present     Artificial lens in position - both     • Nuclear senile cataract of left eye    • Visual discomfort     Visual discomfort - R>L       PAST SURGICAL HISTORY     Past Surgical History:   Procedure Laterality Date   • AUGMENTATION MAMMAPLASTY     • CATARACT EXTRACTION Left 2015    Remove cataract, insert lens (2) - left eye   • HYSTERECTOMY     • OTHER SURGICAL HISTORY  2015    OPTICAL BIOMETRY 57924 (2) - AMARILIS Dubon      SOCIAL HISTORY     Social History     Socioeconomic History   • Marital status:    Tobacco Use   • Smoking status: Former Smoker     Packs/day: 0.50     Years: 53.00     Pack years: 26.50     Types: Cigarettes     Start date:      Quit date: 2020     Years since quittin.3   • Smokeless tobacco: Never Used   Substance and Sexual Activity   • Alcohol use: No   • Drug use: No   • Sexual activity: Not Currently      ALLERGIES   Patient has no known allergies.   MEDICATIONS     Current Outpatient Medications   Medication Sig Dispense Refill   • benazepril (LOTENSIN) 40 MG tablet Take 1 tablet by mouth once daily 90 tablet 3   • FLUoxetine (PROzac) 20 MG capsule Take 1 capsule by  mouth Daily. 30 capsule 0   • omeprazole (PrilOSEC) 20 MG capsule Take 1 capsule by mouth Daily. 30 capsule 11   • simvastatin (ZOCOR) 20 MG tablet TAKE 1 TABLET BY MOUTH EVERY DAY AT BEDTIME 90 tablet 3   • verapamil (CALAN) 80 MG tablet TAKE 1 TABLET BY MOUTH THREE TIMES DAILY 90 tablet 1   • solifenacin (VESIcare) 5 MG tablet Take 1 tablet by mouth Daily. 30 tablet 11     Current Facility-Administered Medications   Medication Dose Route Frequency Provider Last Rate Last Admin   • triamcinolone acetonide (KENALOG-40) injection 80 mg  80 mg Intramuscular Once Karley Rodríguez APRN            The following portions of the patient's history were reviewed and updated as appropriate: allergies, current medications, past family history, past medical history, past social history, past surgical history and problem list.        Assessment/Plan   Diagnoses and all orders for this visit:    1. Urinary frequency (Primary)  -     POCT urinalysis dipstick, manual    2. Decreased hearing of right ear  -     Ambulatory Referral to ENT (Otolaryngology)    Other orders  -     solifenacin (VESIcare) 5 MG tablet; Take 1 tablet by mouth Daily.  Dispense: 30 tablet; Refill: 11       Latest Reference Range & Units 05/05/22 15:31   Color, UA Yellow, Straw, Dark Yellow, Dasha  Yellow   Appearance, UA Clear  Clear   Specific Gravity, UA 1.005 - 1.030  1.015   PH, UA 5.0 - 8.0  6.0   Glucose Negative, 1000 mg/dL (3+) mg/dL Negative   Ketones, UA Negative  Negative   Blood, UA Negative  Negative   Nitrite, UA Negative  Negative   Leukocytes, UA Negative  Negative   Protein, UA Negative mg/dL Negative   Bilirubin, UA Negative  Negative   Urobilinogen, UA Normal  Normal     Suspect vaginal atrophy leading to frequency at night.     Given list of foods/beverages that can cause bladder spasm.     Will try vesicare    Other consideration will be estrace tablet in vaginal nightly 2 weeks then once to 3 times weekly .    No cerumen in ears,  referral to ent             No follow-ups on file.                  This document has been electronically signed by Bill Herrera MD on May 5, 2022 16:24 CDT

## 2022-05-09 ENCOUNTER — TELEPHONE (OUTPATIENT)
Dept: FAMILY MEDICINE CLINIC | Facility: CLINIC | Age: 72
End: 2022-05-09

## 2022-05-09 NOTE — TELEPHONE ENCOUNTER
----- Message from Nichole Packer MA sent at 5/9/2022  7:55 AM CDT -----  Regarding: FW: solifenacin    ----- Message -----  From: Lindsay Herrera  Sent: 5/6/2022  10:01 PM CDT  To: Kori Ochoa [facility]  Subject: solifenacin                                      Ok I know your busy but I just got to tell you..I went back and forth on getting this expensive med 277.00 ouch' well I thought ok if   I don't have to take but 1 month i get it, so i get up there and i say do you have any [facility] codes for this and she said you got no insurance i say no so she looks it up and says I saved you alot   15.00  I thought well dont sound like alot Well that's what I paid 15.83 I almost kissed her haha

## 2022-09-08 RX ORDER — OMEPRAZOLE 20 MG/1
CAPSULE, DELAYED RELEASE ORAL
Qty: 30 CAPSULE | Refills: 11 | Status: SHIPPED | OUTPATIENT
Start: 2022-09-08 | End: 2023-03-15 | Stop reason: DRUGHIGH

## 2022-10-21 ENCOUNTER — FLU SHOT (OUTPATIENT)
Dept: FAMILY MEDICINE CLINIC | Facility: CLINIC | Age: 72
End: 2022-10-21

## 2022-10-21 DIAGNOSIS — Z23 NEED FOR INFLUENZA VACCINATION: Primary | ICD-10-CM

## 2022-10-21 PROCEDURE — 90662 IIV NO PRSV INCREASED AG IM: CPT | Performed by: NURSE PRACTITIONER

## 2022-10-21 PROCEDURE — G0008 ADMIN INFLUENZA VIRUS VAC: HCPCS | Performed by: NURSE PRACTITIONER

## 2022-10-28 RX ORDER — BENAZEPRIL HYDROCHLORIDE 40 MG/1
TABLET, FILM COATED ORAL
Qty: 90 TABLET | Refills: 3 | Status: SHIPPED | OUTPATIENT
Start: 2022-10-28

## 2022-10-28 RX ORDER — SIMVASTATIN 20 MG
TABLET ORAL
Qty: 90 TABLET | Refills: 3 | Status: SHIPPED | OUTPATIENT
Start: 2022-10-28

## 2022-12-30 ENCOUNTER — TELEPHONE (OUTPATIENT)
Dept: FAMILY MEDICINE CLINIC | Facility: CLINIC | Age: 72
End: 2022-12-30

## 2022-12-30 RX ORDER — OSELTAMIVIR PHOSPHATE 75 MG/1
75 CAPSULE ORAL 2 TIMES DAILY
Qty: 10 CAPSULE | Refills: 0 | Status: SHIPPED | OUTPATIENT
Start: 2022-12-30 | End: 2022-12-30

## 2022-12-30 RX ORDER — OSELTAMIVIR PHOSPHATE 75 MG/1
75 CAPSULE ORAL 2 TIMES DAILY
Qty: 10 CAPSULE | Refills: 0 | Status: SHIPPED | OUTPATIENT
Start: 2022-12-30 | End: 2023-01-04

## 2022-12-30 NOTE — TELEPHONE ENCOUNTER
Mrs. Herrera called and said that you had told her you would send her in some Tamiflu if she wanted you to, and she would to [place] please.

## 2023-03-01 ENCOUNTER — LAB (OUTPATIENT)
Dept: LAB | Facility: HOSPITAL | Age: 73
End: 2023-03-01
Payer: MEDICARE

## 2023-03-01 ENCOUNTER — OFFICE VISIT (OUTPATIENT)
Dept: FAMILY MEDICINE CLINIC | Facility: CLINIC | Age: 73
End: 2023-03-01
Payer: MEDICARE

## 2023-03-01 VITALS
SYSTOLIC BLOOD PRESSURE: 166 MMHG | HEIGHT: 66 IN | DIASTOLIC BLOOD PRESSURE: 91 MMHG | WEIGHT: 174.5 LBS | TEMPERATURE: 98.2 F | OXYGEN SATURATION: 97 % | HEART RATE: 77 BPM | BODY MASS INDEX: 28.04 KG/M2

## 2023-03-01 DIAGNOSIS — Z78.0 POST-MENOPAUSAL: ICD-10-CM

## 2023-03-01 DIAGNOSIS — I10 ESSENTIAL HYPERTENSION: ICD-10-CM

## 2023-03-01 DIAGNOSIS — K21.9 GASTROESOPHAGEAL REFLUX DISEASE, UNSPECIFIED WHETHER ESOPHAGITIS PRESENT: ICD-10-CM

## 2023-03-01 DIAGNOSIS — H91.91 HEARING LOSS OF RIGHT EAR, UNSPECIFIED HEARING LOSS TYPE: ICD-10-CM

## 2023-03-01 DIAGNOSIS — Z12.31 SCREENING MAMMOGRAM FOR BREAST CANCER: ICD-10-CM

## 2023-03-01 DIAGNOSIS — Z00.00 MEDICARE ANNUAL WELLNESS VISIT, SUBSEQUENT: Primary | ICD-10-CM

## 2023-03-01 DIAGNOSIS — R10.11 RIGHT UPPER QUADRANT ABDOMINAL PAIN: ICD-10-CM

## 2023-03-01 PROCEDURE — 85027 COMPLETE CBC AUTOMATED: CPT | Performed by: FAMILY MEDICINE

## 2023-03-01 PROCEDURE — G0439 PPPS, SUBSEQ VISIT: HCPCS | Performed by: FAMILY MEDICINE

## 2023-03-01 PROCEDURE — 1170F FXNL STATUS ASSESSED: CPT | Performed by: FAMILY MEDICINE

## 2023-03-01 PROCEDURE — 1159F MED LIST DOCD IN RCRD: CPT | Performed by: FAMILY MEDICINE

## 2023-03-01 PROCEDURE — 80053 COMPREHEN METABOLIC PANEL: CPT | Performed by: FAMILY MEDICINE

## 2023-03-01 PROCEDURE — 1125F AMNT PAIN NOTED PAIN PRSNT: CPT | Performed by: FAMILY MEDICINE

## 2023-03-01 PROCEDURE — 99214 OFFICE O/P EST MOD 30 MIN: CPT | Performed by: FAMILY MEDICINE

## 2023-03-01 PROCEDURE — 80061 LIPID PANEL: CPT | Performed by: FAMILY MEDICINE

## 2023-03-01 RX ORDER — ATENOLOL AND CHLORTHALIDONE TABLET 50; 25 MG/1; MG/1
.5-1 TABLET ORAL DAILY
Qty: 30 TABLET | Refills: 0 | Status: SHIPPED | OUTPATIENT
Start: 2023-03-01 | End: 2023-03-08 | Stop reason: ALTCHOICE

## 2023-03-01 NOTE — PROGRESS NOTES
The ABCs of the Annual Wellness Visit  Subsequent Medicare Wellness Visit    Subjective    Lindsay Herrera is a 72 y.o. female who presents for a Subsequent Medicare Wellness Visit.    The following portions of the patient's history were reviewed and   updated as appropriate: allergies, current medications, past family history, past medical history, past social history, past surgical history and problem list.    Compared to one year ago, the patient feels her physical   health is the same.    Compared to one year ago, the patient feels her mental   health is the same.    Recent Hospitalizations:  She was not admitted to the hospital during the last year.       Current Medical Providers:  Patient Care Team:  Bill Herrera MD as PCP - General (Family Medicine)    Outpatient Medications Prior to Visit   Medication Sig Dispense Refill   • benazepril (LOTENSIN) 40 MG tablet Take 1 tablet by mouth once daily 90 tablet 3   • FLUoxetine (PROzac) 20 MG capsule Take 1 capsule by mouth Daily. 30 capsule 0   • omeprazole (priLOSEC) 20 MG capsule Take 1 capsule by mouth once daily 30 capsule 11   • simvastatin (ZOCOR) 20 MG tablet TAKE 1 TABLET BY MOUTH ONCE DAILY AT BEDTIME 90 tablet 3   • solifenacin (VESIcare) 5 MG tablet Take 1 tablet by mouth Daily. 30 tablet 11   • verapamil (CALAN) 80 MG tablet TAKE 1 TABLET BY MOUTH THREE TIMES DAILY 90 tablet 1     Facility-Administered Medications Prior to Visit   Medication Dose Route Frequency Provider Last Rate Last Admin   • triamcinolone acetonide (KENALOG-40) injection 80 mg  80 mg Intramuscular Once Karley Rodríguez, MARANDA           No opioid medication identified on active medication list. I have reviewed chart for other potential  high risk medication/s and harmful drug interactions in the elderly.          Aspirin is not on active medication list.  Aspirin use is not indicated based on review of current medical condition/s. Risk of harm outweighs potential benefits.   ".    There is no problem list on file for this patient.    Advance Care Planning  Advance Directive is not on file.  ACP discussion was held with the patient during this visit. Patient does not have an advance directive, information provided.     Objective    Vitals:    03/01/23 1117   BP: 166/91   BP Location: Left arm   Patient Position: Sitting   Cuff Size: Adult   Pulse: 77   Temp: 98.2 °F (36.8 °C)   TempSrc: Infrared   SpO2: 97%   Weight: 79.2 kg (174 lb 8 oz)   Height: 167.6 cm (65.98\")   PainSc:   3   PainLoc: Back     Estimated body mass index is 28.18 kg/m² as calculated from the following:    Height as of this encounter: 167.6 cm (65.98\").    Weight as of this encounter: 79.2 kg (174 lb 8 oz).    BMI is >= 25 and <30. (Overweight) The following options were offered after discussion;: exercise counseling/recommendations      Does the patient have evidence of cognitive impairment? No          HEALTH RISK ASSESSMENT    Smoking Status:  Social History     Tobacco Use   Smoking Status Former   • Packs/day: 0.50   • Years: 53.00   • Pack years: 26.50   • Types: Cigarettes   • Start date: 1967   • Quit date: 1/9/2020   • Years since quitting: 3.1   • Passive exposure: Current   Smokeless Tobacco Never     Alcohol Consumption:  Social History     Substance and Sexual Activity   Alcohol Use No     Fall Risk Screen:    ALEJANDRAADI Fall Risk Assessment was completed, and patient is at LOW risk for falls.Assessment completed on:3/1/2023    Depression Screening:  PHQ-2/PHQ-9 Depression Screening 3/1/2023   Little Interest or Pleasure in Doing Things 0-->not at all   Feeling Down, Depressed or Hopeless 0-->not at all   PHQ-9: Brief Depression Severity Measure Score 0       Health Habits and Functional and Cognitive Screening:  Functional & Cognitive Status 3/1/2023   Do you have difficulty preparing food and eating? No   Do you have difficulty bathing yourself, getting dressed or grooming yourself? No   Do you have " difficulty using the toilet? No   Do you have difficulty moving around from place to place? No   Do you have trouble with steps or getting out of a bed or a chair? No   Current Diet Well Balanced Diet   Dental Exam Not up to date        Dental Exam Comment -   Eye Exam Not up to date   Exercise (times per week) 0 times per week   Current Exercises Include No Regular Exercise   Current Exercise Activities Include -   Do you need help using the phone?  No   Are you deaf or do you have serious difficulty hearing?  Yes   Do you need help with transportation? No   Do you need help shopping? No   Do you need help preparing meals?  No   Do you need help with housework?  No   Do you need help with laundry? No   Do you need help taking your medications? No   Do you need help managing money? No   Do you ever drive or ride in a car without wearing a seat belt? No   Have you felt unusual stress, anger or loneliness in the last month? No   Who do you live with? Spouse   If you need help, do you have trouble finding someone available to you? No   Have you been bothered in the last four weeks by sexual problems? No   Do you have difficulty concentrating, remembering or making decisions? No       Age-appropriate Screening Schedule:  Refer to the list below for future screening recommendations based on patient's age, sex and/or medical conditions. Orders for these recommended tests are listed in the plan section. The patient has been provided with a written plan.    Health Maintenance   Topic Date Due   • MAMMOGRAM  03/01/2023 (Originally 10/1/2022)   • DXA SCAN  03/01/2023 (Originally 1950)   • LUNG CANCER SCREENING  03/16/2023 (Originally 10/27/2001)   • COVID-19 Vaccine (5 - Booster for Pfizer series) 03/20/2023 (Originally 8/2/2022)   • ANNUAL WELLNESS VISIT  04/08/2023 (Originally 9/14/2022)   • PAP SMEAR  04/23/2023 (Originally 11/22/2016)   • ZOSTER VACCINE (1 of 2) 03/01/2024 (Originally 10/27/2001)   • COLORECTAL  CANCER SCREENING  05/30/2024   • TDAP/TD VACCINES (2 - Td or Tdap) 09/11/2030   • INFLUENZA VACCINE  Completed   • Pneumococcal Vaccine 65+  Completed   • HEPATITIS C SCREENING  Discontinued                CMS Preventative Services Quick Reference  Risk Factors Identified During Encounter  Hearing Problem: Referral to Audiologist ordered  Polypharmacy: Medication List reviewed  The above risks/problems have been discussed with the patient.  Pertinent information has been shared with the patient in the After Visit Summary.  An After Visit Summary and PPPS were made available to the patient.    Follow Up:   Next Medicare Wellness visit to be scheduled in 1 year.       Additional E&M Note during same encounter follows:  Patient has multiple medical problems which are significant and separately identifiable that require additional work above and beyond the Medicare Wellness Visit.      Chief Complaint  Medicare Wellness-subsequent and Heartburn    Subjective        HPI  Lindsay Herrera is also being seen today for gallbladder.  When eating certain foods, epigastric or right side pain that goes to her back. She thinks it is her gallbladder.   Also  Severe reflux and it seems to worsen when taking one of her medicines.   prilosec not helping, she takes on empty stomach.   also  She originally was on amlodipine for her blood pressure and wanted it changed to something on the $4 list at [facility]. It was changed to verapamil 80mg tid.        Review of Systems   Constitutional: Negative.    HENT: Positive for hearing loss.    Eyes: Negative.    Respiratory: Negative.    Cardiovascular: Negative.    Gastrointestinal: Positive for abdominal pain.   Endocrine: Negative.    Genitourinary: Negative.    Allergic/Immunologic: Negative.    Neurological: Negative.    Hematological: Negative.    Psychiatric/Behavioral: Negative.        Objective   Vital Signs:  /91 (BP Location: Left arm, Patient Position: Sitting, Cuff Size:  "Adult)   Pulse 77   Temp 98.2 °F (36.8 °C) (Infrared)   Ht 167.6 cm (65.98\")   Wt 79.2 kg (174 lb 8 oz)   SpO2 97%   BMI 28.18 kg/m²     Physical Exam  Vitals and nursing note reviewed.   Constitutional:       Appearance: Normal appearance. She is well-developed.   HENT:      Head: Normocephalic and atraumatic.      Right Ear: External ear normal.      Left Ear: External ear normal.      Nose: Nose normal.   Eyes:      Extraocular Movements: Extraocular movements intact.      Conjunctiva/sclera: Conjunctivae normal.      Pupils: Pupils are equal, round, and reactive to light.   Pulmonary:      Effort: Pulmonary effort is normal.   Musculoskeletal:         General: Normal range of motion.      Cervical back: Normal range of motion.   Neurological:      General: No focal deficit present.      Mental Status: She is alert and oriented to person, place, and time.   Psychiatric:         Behavior: Behavior normal.         Thought Content: Thought content normal.         Judgment: Judgment normal.                         Assessment and Plan   Diagnoses and all orders for this visit:    1. Medicare annual wellness visit, subsequent (Primary)  -     DEXA Bone Density Axial  -     Mammo Screening Digital Tomosynthesis Bilateral With CAD  -     CBC (No Diff)  -     Comprehensive Metabolic Panel  -     Lipid Panel    2. Post-menopausal  -     DEXA Bone Density Axial    3. Screening mammogram for breast cancer  -     Mammo Screening Digital Tomosynthesis Bilateral With CAD    4. Gastroesophageal reflux disease, unspecified whether esophagitis present  -     Ambulatory Referral to Gastroenterology    5. Right upper quadrant abdominal pain  -     US Gallbladder    6. Hearing loss of right ear, unspecified hearing loss type  -     Ambulatory Referral to Audiology    7. Essential hypertension    Other orders  -     Discontinue: atenolol-chlorthalidone (Tenoretic 50) 50-25 MG per tablet; Take 0.5-1 tablets by mouth Daily.  " Dispense: 30 tablet; Refill: 0    labwork and screening tests for medicare wellness    Will stop verapamil since can cause gerd.  I asked her to take the prilosec before a meal, not on empty stomach. Referral to gi    Us gallbladder    Referral to audiologist    In place of verapamil, give tenoretic 50/25.  Start 1/2 tablet qam, bp check 1 week.           Follow Up   No follow-ups on file.  Patient was given instructions and counseling regarding her condition or for health maintenance advice. Please see specific information pulled into the AVS if appropriate.

## 2023-03-02 LAB
ALBUMIN SERPL-MCNC: 4.1 G/DL (ref 3.5–5.2)
ALBUMIN/GLOB SERPL: 1.8 G/DL
ALP SERPL-CCNC: 96 U/L (ref 39–117)
ALT SERPL W P-5'-P-CCNC: 6 U/L (ref 1–33)
ANION GAP SERPL CALCULATED.3IONS-SCNC: 10 MMOL/L (ref 5–15)
AST SERPL-CCNC: 12 U/L (ref 1–32)
BILIRUB SERPL-MCNC: 0.5 MG/DL (ref 0–1.2)
BUN SERPL-MCNC: 11 MG/DL (ref 8–23)
BUN/CREAT SERPL: 11.3 (ref 7–25)
CALCIUM SPEC-SCNC: 9.1 MG/DL (ref 8.6–10.5)
CHLORIDE SERPL-SCNC: 106 MMOL/L (ref 98–107)
CHOLEST SERPL-MCNC: 168 MG/DL (ref 0–200)
CO2 SERPL-SCNC: 24 MMOL/L (ref 22–29)
CREAT SERPL-MCNC: 0.97 MG/DL (ref 0.57–1)
DEPRECATED RDW RBC AUTO: 43.3 FL (ref 37–54)
EGFRCR SERPLBLD CKD-EPI 2021: 62.6 ML/MIN/1.73
ERYTHROCYTE [DISTWIDTH] IN BLOOD BY AUTOMATED COUNT: 13.1 % (ref 12.3–15.4)
GLOBULIN UR ELPH-MCNC: 2.3 GM/DL
GLUCOSE SERPL-MCNC: 92 MG/DL (ref 65–99)
HCT VFR BLD AUTO: 36.8 % (ref 34–46.6)
HDLC SERPL-MCNC: 53 MG/DL (ref 40–60)
HGB BLD-MCNC: 12.4 G/DL (ref 12–15.9)
LDLC SERPL CALC-MCNC: 94 MG/DL (ref 0–100)
LDLC/HDLC SERPL: 1.72 {RATIO}
MCH RBC QN AUTO: 30.5 PG (ref 26.6–33)
MCHC RBC AUTO-ENTMCNC: 33.7 G/DL (ref 31.5–35.7)
MCV RBC AUTO: 90.6 FL (ref 79–97)
PLATELET # BLD AUTO: 201 10*3/MM3 (ref 140–450)
PMV BLD AUTO: 14.2 FL (ref 6–12)
POTASSIUM SERPL-SCNC: 4 MMOL/L (ref 3.5–5.2)
PROT SERPL-MCNC: 6.4 G/DL (ref 6–8.5)
RBC # BLD AUTO: 4.06 10*6/MM3 (ref 3.77–5.28)
SODIUM SERPL-SCNC: 140 MMOL/L (ref 136–145)
TRIGL SERPL-MCNC: 118 MG/DL (ref 0–150)
VLDLC SERPL-MCNC: 21 MG/DL (ref 5–40)
WBC NRBC COR # BLD: 3.85 10*3/MM3 (ref 3.4–10.8)

## 2023-03-08 ENCOUNTER — TELEPHONE (OUTPATIENT)
Dept: FAMILY MEDICINE CLINIC | Facility: CLINIC | Age: 73
End: 2023-03-08
Payer: MEDICARE

## 2023-03-08 RX ORDER — CHLORTHALIDONE 25 MG/1
12.5-25 TABLET ORAL
Qty: 30 TABLET | Refills: 0 | Status: SHIPPED | OUTPATIENT
Start: 2023-03-08 | End: 2023-03-15 | Stop reason: SDUPTHER

## 2023-03-09 ENCOUNTER — HOSPITAL ENCOUNTER (OUTPATIENT)
Dept: ULTRASOUND IMAGING | Facility: HOSPITAL | Age: 73
Discharge: HOME | End: 2023-03-09
Admitting: FAMILY MEDICINE
Payer: MEDICARE

## 2023-03-09 PROCEDURE — 76705 ECHO EXAM OF ABDOMEN: CPT

## 2023-03-15 ENCOUNTER — CLINICAL SUPPORT (OUTPATIENT)
Dept: AUDIOLOGY | Facility: CLINIC | Age: 73
End: 2023-03-15
Payer: MEDICARE

## 2023-03-15 ENCOUNTER — OFFICE VISIT (OUTPATIENT)
Dept: FAMILY MEDICINE CLINIC | Facility: CLINIC | Age: 73
End: 2023-03-15
Payer: MEDICARE

## 2023-03-15 VITALS
HEART RATE: 89 BPM | SYSTOLIC BLOOD PRESSURE: 102 MMHG | TEMPERATURE: 97.5 F | WEIGHT: 169 LBS | HEIGHT: 66 IN | DIASTOLIC BLOOD PRESSURE: 67 MMHG | BODY MASS INDEX: 27.16 KG/M2 | OXYGEN SATURATION: 98 %

## 2023-03-15 DIAGNOSIS — K21.9 GASTROESOPHAGEAL REFLUX DISEASE, UNSPECIFIED WHETHER ESOPHAGITIS PRESENT: ICD-10-CM

## 2023-03-15 DIAGNOSIS — I10 ESSENTIAL HYPERTENSION: Primary | ICD-10-CM

## 2023-03-15 DIAGNOSIS — H90.3 SENSORINEURAL HEARING LOSS (SNHL) OF BOTH EARS: Primary | ICD-10-CM

## 2023-03-15 PROCEDURE — 92557 COMPREHENSIVE HEARING TEST: CPT | Performed by: AUDIOLOGIST

## 2023-03-15 PROCEDURE — 1160F RVW MEDS BY RX/DR IN RCRD: CPT | Performed by: FAMILY MEDICINE

## 2023-03-15 PROCEDURE — 99213 OFFICE O/P EST LOW 20 MIN: CPT | Performed by: FAMILY MEDICINE

## 2023-03-15 PROCEDURE — 1159F MED LIST DOCD IN RCRD: CPT | Performed by: FAMILY MEDICINE

## 2023-03-15 PROCEDURE — 92567 TYMPANOMETRY: CPT | Performed by: AUDIOLOGIST

## 2023-03-15 RX ORDER — CHLORTHALIDONE 25 MG/1
12.5 TABLET ORAL
Qty: 45 TABLET | Refills: 3 | Status: SHIPPED | OUTPATIENT
Start: 2023-03-15

## 2023-03-15 RX ORDER — OMEPRAZOLE 40 MG/1
40 CAPSULE, DELAYED RELEASE ORAL DAILY
Qty: 90 CAPSULE | Refills: 3 | Status: SHIPPED | OUTPATIENT
Start: 2023-03-15

## 2023-03-15 NOTE — PROGRESS NOTES
"Giselle Herrera is a 72 y.o. female.     Chief Complaint   Patient presents with   • Hypertension   • Follow-up             History of Present Illness     bp is ok.  Feels fine  Still  Having reflux, she is taking prilosec 20mg before a meal   Us gallbladder normal exam     Review of Systems   Constitutional: Negative for chills, fatigue and fever.   HENT: Negative for congestion, ear discharge, ear pain, facial swelling, hearing loss, postnasal drip, rhinorrhea, sinus pressure, sore throat, trouble swallowing and voice change.    Eyes: Negative for discharge, redness and visual disturbance.   Respiratory: Negative for cough, chest tightness, shortness of breath and wheezing.    Cardiovascular: Negative for chest pain and palpitations.   Gastrointestinal: Negative for abdominal pain, blood in stool, constipation, diarrhea, nausea and vomiting.   Endocrine: Negative for polydipsia and polyuria.   Genitourinary: Negative for dysuria, flank pain, hematuria and urgency.   Musculoskeletal: Negative for arthralgias, back pain, joint swelling and myalgias.   Skin: Negative for rash.   Neurological: Negative for dizziness, weakness, numbness and headaches.   Hematological: Negative for adenopathy.   Psychiatric/Behavioral: Negative for confusion and sleep disturbance. The patient is not nervous/anxious.            /67 (BP Location: Left arm, Patient Position: Sitting, Cuff Size: Adult)   Pulse 89   Temp 97.5 °F (36.4 °C) (Infrared)   Ht 167.6 cm (65.98\")   Wt 76.7 kg (169 lb)   LMP  (LMP Unknown)   SpO2 98%   BMI 27.29 kg/m²       Objective     Physical Exam        PAST MEDICAL HISTORY     Past Medical History:   Diagnosis Date   • Artificial lens present     Artificial lens in position - both     • Nuclear senile cataract of left eye    • Visual discomfort     Visual discomfort - R>L       PAST SURGICAL HISTORY     Past Surgical History:   Procedure Laterality Date   • AUGMENTATION MAMMAPLASTY   "   • CATARACT EXTRACTION Left 07/28/2015    Remove cataract, insert lens (2) - left eye   • HYSTERECTOMY     • OTHER SURGICAL HISTORY  07/21/2015    OPTICAL BIOMETRY 46506 (2) - AMARILIS Dubon      SOCIAL HISTORY     Social History     Socioeconomic History   • Marital status:    Tobacco Use   • Smoking status: Former     Packs/day: 0.50     Years: 53.00     Pack years: 26.50     Types: Cigarettes     Start date: 1967     Quit date: 1/9/2020     Years since quitting: 3.1     Passive exposure: Current   • Smokeless tobacco: Never   Substance and Sexual Activity   • Alcohol use: No   • Drug use: No   • Sexual activity: Not Currently      ALLERGIES   Patient has no known allergies.   MEDICATIONS     Current Outpatient Medications   Medication Sig Dispense Refill   • benazepril (LOTENSIN) 40 MG tablet Take 1 tablet by mouth once daily 90 tablet 3   • chlorthalidone (HYGROTON) 25 MG tablet Take 0.5 tablets by mouth Every Morning. 45 tablet 3   • FLUoxetine (PROzac) 20 MG capsule Take 1 capsule by mouth Daily. 30 capsule 0   • simvastatin (ZOCOR) 20 MG tablet TAKE 1 TABLET BY MOUTH ONCE DAILY AT BEDTIME 90 tablet 3   • solifenacin (VESIcare) 5 MG tablet Take 1 tablet by mouth Daily. 30 tablet 11   • omeprazole (priLOSEC) 40 MG capsule Take 1 capsule by mouth Daily. 90 capsule 3     Current Facility-Administered Medications   Medication Dose Route Frequency Provider Last Rate Last Admin   • triamcinolone acetonide (KENALOG-40) injection 80 mg  80 mg Intramuscular Once Karley Rodríguez, MARANDA            The following portions of the patient's history were reviewed and updated as appropriate: allergies, current medications, past family history, past medical history, past social history, past surgical history and problem list.        Assessment & Plan   Diagnoses and all orders for this visit:    1. Essential hypertension (Primary)    2. Gastroesophageal reflux disease, unspecified whether esophagitis present    Other  orders  -     chlorthalidone (HYGROTON) 25 MG tablet; Take 0.5 tablets by mouth Every Morning.  Dispense: 45 tablet; Refill: 3  -     omeprazole (priLOSEC) 40 MG capsule; Take 1 capsule by mouth Daily.  Dispense: 90 capsule; Refill: 3    bp is fine, she says she feels fine. Refilled chlorthalidone 1 yr.     Increased prilosec to 40mg, encouraged to follow up with gi.                        No follow-ups on file.                  This document has been electronically signed by Bill Herrera MD on March 15, 2023 10:57 CDT     Answers for HPI/ROS submitted by the patient on 3/14/2023  Please describe your symptoms.: heartburn and bloodpessure  Have you had these symptoms before?: Yes  How long have you been having these symptoms?: Greater than 2 weeks  What is the primary reason for your visit?: Other

## 2023-03-17 NOTE — PROGRESS NOTES
STANDARD AUDIOMETRIC EVALUATION      Name:  Lindsay Herrera  :  1950  Age:  72 y.o.  Date of Evaluation:  3/17/2023      HISTORY    Reason for visit:  Lindsay Herrera is seen today for a hearing test at the request of Dr. Bill Herrera.  Patient reports 3 years ago she had her ears cleaned.  Today, she states it feels like she has build up in her ears again, and she has decreased hearing in her right ear.       EVALUATION    See Audiogram    RESULTS        Otoscopy and Tympanometry 226 Hz :  Right Ear:  Otoscopy:  Clear ear canal          Tympanometry:  Middle ear function within normal limits    Left Ear:   Otoscopy:  Clear ear canal        Tympanometry:  Middle ear function within normal limits    Test technique:  Standard Audiometry     Pure Tone Audiometry:   Patient responded to pure tones at 35-85 dB for 250-8000 Hz in right ear, and at 25-80 dB for 250-8000 Hz in left ear.       Speech Audiometry:        Right Ear:  Speech Reception Threshold (SRT) was obtained at 35 dBHL                 Speech Discrimination scores were 100% in quiet when words were presented at 75 dBHL       Left Ear:  Speech Reception Threshold (SRT) was obtained at 30 dBHL                 Speech Discrimination scores were 92% in quiet when words were presented at 70 dBHL    Reliability:   good    IMPRESSIONS:  1.  Tympanometry results are consistent with Middle ear function within normal limits in both ears.  2.  Pure tone results are consistent with mild to severe sloping sensorineural hearing loss  for both ears.       RECOMMENDATIONS:  Test results were reviewed with patient, and all questions were answered at this time.  It was a pleasure seeing Lindsay Herrera in Audiology today.  We would be happy to do further testing or discuss these test as necessary. My thanks to Dr. Bill Herrera for this referral.           This document has been electronically signed by Hyun Wilkinson MS CCC-ENRIQUETA on 2023 10:43 CDT        Hyun Wilkinson MS [facility]  Licensed Audiologist

## 2023-03-28 ENCOUNTER — OFFICE VISIT (OUTPATIENT)
Dept: GASTROENTEROLOGY | Facility: CLINIC | Age: 73
End: 2023-03-28
Payer: MEDICARE

## 2023-03-28 VITALS
SYSTOLIC BLOOD PRESSURE: 110 MMHG | HEIGHT: 66 IN | WEIGHT: 168.2 LBS | HEART RATE: 84 BPM | DIASTOLIC BLOOD PRESSURE: 64 MMHG | BODY MASS INDEX: 27.03 KG/M2

## 2023-03-28 DIAGNOSIS — R11.14 BILIOUS VOMITING WITH NAUSEA: ICD-10-CM

## 2023-03-28 DIAGNOSIS — K21.00 GASTROESOPHAGEAL REFLUX DISEASE WITH ESOPHAGITIS WITHOUT HEMORRHAGE: Primary | ICD-10-CM

## 2023-03-28 PROCEDURE — 1160F RVW MEDS BY RX/DR IN RCRD: CPT | Performed by: NURSE PRACTITIONER

## 2023-03-28 PROCEDURE — 1159F MED LIST DOCD IN RCRD: CPT | Performed by: NURSE PRACTITIONER

## 2023-03-28 PROCEDURE — 3074F SYST BP LT 130 MM HG: CPT | Performed by: NURSE PRACTITIONER

## 2023-03-28 PROCEDURE — 3078F DIAST BP <80 MM HG: CPT | Performed by: NURSE PRACTITIONER

## 2023-03-28 PROCEDURE — 99213 OFFICE O/P EST LOW 20 MIN: CPT | Performed by: NURSE PRACTITIONER

## 2023-03-28 RX ORDER — SODIUM CHLORIDE 9 MG/ML
40 INJECTION, SOLUTION INTRAVENOUS AS NEEDED
Status: CANCELLED | OUTPATIENT
Start: 2023-03-28

## 2023-03-28 RX ORDER — DEXTROSE AND SODIUM CHLORIDE 5; .45 G/100ML; G/100ML
30 INJECTION, SOLUTION INTRAVENOUS CONTINUOUS PRN
Status: CANCELLED | OUTPATIENT
Start: 2023-03-28

## 2023-03-28 NOTE — PROGRESS NOTES
Chief Complaint   Patient presents with   • Heartburn       Subjective    Lindsay Herrera is a 72 y.o. female. she is here today for follow-up.                                                                  Assessment & Plan                                     1. Gastroesophageal reflux disease with esophagitis without hemorrhage    2. Bilious vomiting with nausea      Plan; we will schedule patient for EGD due to worsening GERD nausea with vomiting.  Consider HIDA scan pending results of the above.    Follow-up: Return in about 4 weeks (around 4/25/2023) for Recheck, After test.     HPI  72-year-old female presents to discuss worsening abdominal pain GERD nausea and vomiting.  Started about 6 months ago has progressively worsened.  She had normal colonoscopy about 5 years ago with repeat recommended in 10 years for surveillance.  Dates when she eats certain foods she has epigastric or right upper quadrant pain and severe acid indigestion.  She takes Prilosec on an empty stomach and it feels like it is not helping symptoms at all.  Recently increased to 40 mg.  CMP noted ALT 6, AST 12, alkaline phosphatase 96 total bili 0.5  Ultrasound of gallbladder was normal March 2023      Review of Systems  Review of Systems   Constitutional: Negative for activity change, appetite change, chills, diaphoresis, fatigue, fever and unexpected weight change.   HENT: Negative for sore throat and trouble swallowing.    Respiratory: Negative for shortness of breath.    Gastrointestinal: Positive for abdominal pain (Increasing episodes of GERD causing N/V more at night started intermittently since Sept progressively worsened ). Negative for abdominal distention, anal bleeding, blood in stool, constipation, diarrhea, nausea, rectal pain and vomiting.   Musculoskeletal: Negative for arthralgias.   Skin: Negative for pallor.  "  Neurological: Negative for light-headedness.       /64 (BP Location: Left arm)   Pulse 84   Ht 167.6 cm (66\")   Wt 76.3 kg (168 lb 3.2 oz)   LMP  (LMP Unknown)   BMI 27.15 kg/m²     Objective      Physical Exam  Constitutional:       General: She is not in acute distress.     Appearance: Normal appearance. She is normal weight. She is not ill-appearing.   HENT:      Head: Normocephalic and atraumatic.   Pulmonary:      Effort: Pulmonary effort is normal.   Abdominal:      General: Abdomen is flat. Bowel sounds are normal. There is no distension.      Palpations: Abdomen is soft. There is no mass.      Tenderness: There is abdominal tenderness in the epigastric area.   Neurological:      Mental Status: She is alert.               The following portions of the patient's history were reviewed and updated as appropriate:   Past Medical History:   Diagnosis Date   • Artificial lens present     Artificial lens in position - both     • GERD (gastroesophageal reflux disease)    • Hyperlipidemia    • Hypertension    • Nuclear senile cataract of left eye    • Visual discomfort     Visual discomfort - R>L      Past Surgical History:   Procedure Laterality Date   • AUGMENTATION MAMMAPLASTY     • CATARACT EXTRACTION Left 2015    Remove cataract, insert lens (2) - left eye   • COLONOSCOPY     • HYSTERECTOMY     • OTHER SURGICAL HISTORY  2015    OPTICAL BIOMETRY 89602 (2) - AMARILIS Dubon     History reviewed. No pertinent family history.  OB History        3    Para   3    Term   3            AB        Living           SAB        IAB        Ectopic        Molar        Multiple        Live Births                  No Known Allergies  Social History     Socioeconomic History   • Marital status:    Tobacco Use   • Smoking status: Former     Packs/day: 0.50     Years: 25.00     Pack years: 12.50     Types: Cigarettes     Start date: 1990     Quit date: 2017     Years since " quittin.1     Passive exposure: Current   • Smokeless tobacco: Never   Substance and Sexual Activity   • Alcohol use: Never   • Drug use: Never   • Sexual activity: Not Currently     Partners: Male     Birth control/protection: Condom     Current Medications:  Prior to Admission medications    Medication Sig Start Date End Date Taking? Authorizing Provider   benazepril (LOTENSIN) 40 MG tablet Take 1 tablet by mouth once daily 10/28/22  Yes Karley Rodríguez APRN   chlorthalidone (HYGROTON) 25 MG tablet Take 0.5 tablets by mouth Every Morning. 3/15/23  Yes Bill Herrera MD   FLUoxetine (PROzac) 20 MG capsule Take 1 capsule by mouth Daily. 19  Yes Bill Herrera MD   omeprazole (priLOSEC) 40 MG capsule Take 1 capsule by mouth Daily. 3/15/23  Yes Bill Herrera MD   simvastatin (ZOCOR) 20 MG tablet TAKE 1 TABLET BY MOUTH ONCE DAILY AT BEDTIME 10/28/22  Yes Karley Rodríguez APRN   solifenacin (VESIcare) 5 MG tablet Take 1 tablet by mouth Daily. 22  Yes Bill Herrera MD     Orders placed during this encounter include:  Orders Placed This Encounter   Procedures   • Obtain Informed Consent     Standing Status:   Future     Order Specific Question:   Informed Consent Given For     Answer:   ESOPHAGOGASTRODUODENOSCOPY possible dilation     ESOPHAGOGASTRODUODENOSCOPY possible dilation (N/A)  No orders of the defined types were placed in this encounter.        Review and/or summary of lab tests, radiology, procedures, medications. Review and summary of old records and obtaining of history. The risks and benefits of my recommendations, as well as other treatment options were discussed . Any questions/concerned were answered. Patient voiced understanding and agreement.          This document has been electronically signed by MARANDA John on April 3, 2023 10:30 CDT                                               Results for orders placed or performed in visit on 23   CBC (No Diff)    Specimen:  Blood   Result Value Ref Range    WBC 3.85 3.40 - 10.80 10*3/mm3    RBC 4.06 3.77 - 5.28 10*6/mm3    Hemoglobin 12.4 12.0 - 15.9 g/dL    Hematocrit 36.8 34.0 - 46.6 %    MCV 90.6 79.0 - 97.0 fL    MCH 30.5 26.6 - 33.0 pg    MCHC 33.7 31.5 - 35.7 g/dL    RDW 13.1 12.3 - 15.4 %    RDW-SD 43.3 37.0 - 54.0 fl    MPV 14.2 (H) 6.0 - 12.0 fL    Platelets 201 140 - 450 10*3/mm3   Lipid Panel    Specimen: Blood   Result Value Ref Range    Total Cholesterol 168 0 - 200 mg/dL    Triglycerides 118 0 - 150 mg/dL    HDL Cholesterol 53 40 - 60 mg/dL    LDL Cholesterol  94 0 - 100 mg/dL    VLDL Cholesterol 21 5 - 40 mg/dL    LDL/HDL Ratio 1.72    Comprehensive Metabolic Panel    Specimen: Blood   Result Value Ref Range    Glucose 92 65 - 99 mg/dL    BUN 11 8 - 23 mg/dL    Creatinine 0.97 0.57 - 1.00 mg/dL    Sodium 140 136 - 145 mmol/L    Potassium 4.0 3.5 - 5.2 mmol/L    Chloride 106 98 - 107 mmol/L    CO2 24.0 22.0 - 29.0 mmol/L    Calcium 9.1 8.6 - 10.5 mg/dL    Total Protein 6.4 6.0 - 8.5 g/dL    Albumin 4.1 3.5 - 5.2 g/dL    ALT (SGPT) 6 1 - 33 U/L    AST (SGOT) 12 1 - 32 U/L    Alkaline Phosphatase 96 39 - 117 U/L    Total Bilirubin 0.5 0.0 - 1.2 mg/dL    Globulin 2.3 gm/dL    A/G Ratio 1.8 g/dL    BUN/Creatinine Ratio 11.3 7.0 - 25.0    Anion Gap 10.0 5.0 - 15.0 mmol/L    eGFR 62.6 >60.0 mL/min/1.73   Results for orders placed or performed in visit on 05/05/22   POCT urinalysis dipstick, manual    Specimen: Urine   Result Value Ref Range    Color Yellow Yellow, Straw, Dark Yellow, Dasha    Clarity, UA Clear Clear    Glucose, UA Negative Negative, 1000 mg/dL (3+) mg/dL    Bilirubin Negative Negative    Ketones, UA Negative Negative    Specific Gravity  1.015 1.005 - 1.030    Blood, UA Negative Negative    pH, Urine 6.0 5.0 - 8.0    Protein, POC Negative Negative mg/dL    Urobilinogen, UA Normal Normal    Leukocytes Negative Negative    Nitrite, UA Negative Negative   Results for orders placed or performed in visit on 11/15/21    COVID-19, [facility] IN-HOUSE, NP SWAB IN TRANSPORT MEDIA 8-10 HR TAT - Swab, Oropharynx    Specimen: Oropharynx; Swab   Result Value Ref Range    COVID19 Detected (C) Not Detected - Ref. Range   Results for orders placed or performed in visit on 09/20/21   POCT FECAL OCCULT BLOOD BY IMMUNOASSAY    Specimen: Stool   Result Value Ref Range    POC OCCULT BLOOD BY IMMUNOASSAY Negative Negative   Results for orders placed or performed in visit on 09/14/21   CBC (No Diff)    Specimen: Blood   Result Value Ref Range    WBC 6.17 3.40 - 10.80 10*3/mm3    RBC 3.97 3.77 - 5.28 10*6/mm3    Hemoglobin 12.2 12.0 - 15.9 g/dL    Hematocrit 36.9 34.0 - 46.6 %    MCV 92.9 79.0 - 97.0 fL    MCH 30.7 26.6 - 33.0 pg    MCHC 33.1 31.5 - 35.7 g/dL    RDW 13.0 12.3 - 15.4 %    RDW-SD 44.5 37.0 - 54.0 fl    MPV 13.4 (H) 6.0 - 12.0 fL    Platelets 240 140 - 450 10*3/mm3   Lipid Panel    Specimen: Blood   Result Value Ref Range    Total Cholesterol 174 0 - 200 mg/dL    Triglycerides 265 (H) 0 - 150 mg/dL    HDL Cholesterol 52 40 - 60 mg/dL    LDL Cholesterol  79 0 - 100 mg/dL    VLDL Cholesterol 43 (H) 5 - 40 mg/dL    LDL/HDL Ratio 1.33    Comprehensive Metabolic Panel    Specimen: Blood   Result Value Ref Range    Glucose 100 (H) 65 - 99 mg/dL    BUN 15 8 - 23 mg/dL    Creatinine 0.88 0.57 - 1.00 mg/dL    Sodium 143 136 - 145 mmol/L    Potassium 4.3 3.5 - 5.2 mmol/L    Chloride 108 (H) 98 - 107 mmol/L    CO2 22.9 22.0 - 29.0 mmol/L    Calcium 8.8 8.6 - 10.5 mg/dL    Total Protein 6.3 6.0 - 8.5 g/dL    Albumin 4.20 3.50 - 5.20 g/dL    ALT (SGPT) 17 1 - 33 U/L    AST (SGOT) 20 1 - 32 U/L    Alkaline Phosphatase 78 39 - 117 U/L    Total Bilirubin 0.2 0.0 - 1.2 mg/dL    eGFR Non African Amer 64 >60 mL/min/1.73    Globulin 2.1 gm/dL    A/G Ratio 2.0 g/dL    BUN/Creatinine Ratio 17.0 7.0 - 25.0    Anion Gap 12.1 5.0 - 15.0 mmol/L   Results for orders placed or performed in visit on 09/14/20   POCT FECAL OCCULT BLOOD BY IMMUNOASSAY    Specimen:  Stool   Result Value Ref Range    POC OCCULT BLOOD BY IMMUNOASSAY Negative Negative   Results for orders placed or performed in visit on 08/27/19   CBC (No Diff)    Specimen: Blood   Result Value Ref Range    WBC 3.96 3.40 - 10.80 10*3/mm3    RBC 4.46 3.77 - 5.28 10*6/mm3    Hemoglobin 13.7 12.0 - 15.9 g/dL    Hematocrit 42.1 34.0 - 46.6 %    MCV 94.4 79.0 - 97.0 fL    MCH 30.7 26.6 - 33.0 pg    MCHC 32.5 31.5 - 35.7 g/dL    RDW 12.6 12.3 - 15.4 %    RDW-SD 43.8 37.0 - 54.0 fl    MPV 14.0 (H) 6.0 - 12.0 fL    Platelets 190 140 - 450 10*3/mm3   TSH    Specimen: Blood   Result Value Ref Range    TSH 2.260 0.270 - 4.200 mIU/mL   Lipid Panel    Specimen: Blood   Result Value Ref Range    Total Cholesterol 188 0 - 200 mg/dL    Triglycerides 191 (H) 0 - 150 mg/dL    HDL Cholesterol 58 40 - 60 mg/dL    LDL Cholesterol  92 0 - 100 mg/dL    VLDL Cholesterol 38.2 5 - 40 mg/dL    LDL/HDL Ratio 1.58      *Note: Due to a large number of results and/or encounters for the requested time period, some results have not been displayed. A complete set of results can be found in Results Review.

## 2023-04-25 RX ORDER — NYSTATIN 100000 [USP'U]/G
POWDER TOPICAL 3 TIMES DAILY
Qty: 60 G | Refills: 11 | Status: SHIPPED | OUTPATIENT
Start: 2023-04-25

## 2023-06-08 RX ORDER — SOLIFENACIN SUCCINATE 5 MG/1
5 TABLET, FILM COATED ORAL DAILY
Qty: 30 TABLET | Refills: 11 | Status: SHIPPED | OUTPATIENT
Start: 2023-06-08

## 2023-09-26 ENCOUNTER — OFFICE VISIT (OUTPATIENT)
Dept: FAMILY MEDICINE CLINIC | Facility: CLINIC | Age: 73
End: 2023-09-26
Payer: MEDICARE

## 2023-09-26 VITALS
BODY MASS INDEX: 27 KG/M2 | HEIGHT: 66 IN | TEMPERATURE: 98.2 F | DIASTOLIC BLOOD PRESSURE: 87 MMHG | WEIGHT: 168 LBS | HEART RATE: 78 BPM | OXYGEN SATURATION: 98 % | SYSTOLIC BLOOD PRESSURE: 137 MMHG

## 2023-09-26 DIAGNOSIS — M25.561 CHRONIC PAIN OF RIGHT KNEE: Primary | ICD-10-CM

## 2023-09-26 DIAGNOSIS — F32.A DEPRESSION, UNSPECIFIED DEPRESSION TYPE: ICD-10-CM

## 2023-09-26 DIAGNOSIS — G89.29 CHRONIC PAIN OF RIGHT KNEE: Primary | ICD-10-CM

## 2023-09-26 PROCEDURE — 96372 THER/PROPH/DIAG INJ SC/IM: CPT | Performed by: FAMILY MEDICINE

## 2023-09-26 PROCEDURE — 1159F MED LIST DOCD IN RCRD: CPT | Performed by: FAMILY MEDICINE

## 2023-09-26 PROCEDURE — 99213 OFFICE O/P EST LOW 20 MIN: CPT | Performed by: FAMILY MEDICINE

## 2023-09-26 PROCEDURE — 3075F SYST BP GE 130 - 139MM HG: CPT | Performed by: FAMILY MEDICINE

## 2023-09-26 PROCEDURE — 3079F DIAST BP 80-89 MM HG: CPT | Performed by: FAMILY MEDICINE

## 2023-09-26 PROCEDURE — 1160F RVW MEDS BY RX/DR IN RCRD: CPT | Performed by: FAMILY MEDICINE

## 2023-09-26 RX ORDER — TRIAMCINOLONE ACETONIDE 40 MG/ML
80 INJECTION, SUSPENSION INTRA-ARTICULAR; INTRAMUSCULAR ONCE
Status: COMPLETED | OUTPATIENT
Start: 2023-09-26 | End: 2023-09-26

## 2023-09-26 RX ORDER — FLUOXETINE HYDROCHLORIDE 20 MG/1
20 CAPSULE ORAL DAILY
Qty: 90 CAPSULE | Refills: 3 | Status: SHIPPED | OUTPATIENT
Start: 2023-09-26

## 2023-09-26 RX ADMIN — TRIAMCINOLONE ACETONIDE 80 MG: 40 INJECTION, SUSPENSION INTRA-ARTICULAR; INTRAMUSCULAR at 13:15

## 2023-09-26 NOTE — PROGRESS NOTES
" Giselle Herrera is a 72 y.o. female.     Chief Complaint   Patient presents with    Knee Pain             Knee Pain   Pertinent negatives include no numbness.      Wants a kenalog shot in hip.  It  helps her right knee pain and low back pain  Also  Needs refill of her prozac.   Feels fine     Review of Systems   Constitutional:  Negative for chills, fatigue and fever.   HENT:  Negative for congestion, ear discharge, ear pain, facial swelling, hearing loss, postnasal drip, rhinorrhea, sinus pressure, sore throat, trouble swallowing and voice change.    Eyes:  Negative for discharge, redness and visual disturbance.   Respiratory:  Negative for cough, chest tightness, shortness of breath and wheezing.    Cardiovascular:  Negative for chest pain and palpitations.   Gastrointestinal:  Negative for abdominal pain, blood in stool, constipation, diarrhea, nausea and vomiting.   Endocrine: Negative for polydipsia and polyuria.   Genitourinary:  Negative for dysuria, flank pain, hematuria and urgency.   Musculoskeletal:  Positive for arthralgias. Negative for back pain, joint swelling and myalgias.   Skin:  Negative for rash.   Neurological:  Negative for dizziness, weakness, numbness and headaches.   Hematological:  Negative for adenopathy.   Psychiatric/Behavioral:  Negative for confusion and sleep disturbance. The patient is not nervous/anxious.          /87 (BP Location: Left arm, Patient Position: Sitting, Cuff Size: Adult)   Pulse 78   Temp 98.2 °F (36.8 °C) (Infrared)   Ht 167.6 cm (65.98\")   Wt 76.2 kg (168 lb)   LMP  (LMP Unknown)   SpO2 98%   BMI 27.13 kg/m²       Objective     Physical Exam  Vitals and nursing note reviewed.   Constitutional:       Appearance: Normal appearance. She is well-developed.   HENT:      Head: Normocephalic and atraumatic.      Right Ear: External ear normal.      Left Ear: External ear normal.      Nose: Nose normal.   Eyes:      Extraocular Movements: " Extraocular movements intact.      Conjunctiva/sclera: Conjunctivae normal.      Pupils: Pupils are equal, round, and reactive to light.   Pulmonary:      Effort: Pulmonary effort is normal.   Musculoskeletal:         General: Normal range of motion.      Cervical back: Normal range of motion.   Neurological:      General: No focal deficit present.      Mental Status: She is alert and oriented to person, place, and time.   Psychiatric:         Behavior: Behavior normal.         Thought Content: Thought content normal.         Judgment: Judgment normal.           PAST MEDICAL HISTORY     Past Medical History:   Diagnosis Date    Artificial lens present     Artificial lens in position - both      GERD (gastroesophageal reflux disease)     Hyperlipidemia     Hypertension     Nuclear senile cataract of left eye     Visual discomfort     Visual discomfort - R>L       PAST SURGICAL HISTORY     Past Surgical History:   Procedure Laterality Date    AUGMENTATION MAMMAPLASTY      CATARACT EXTRACTION Left 2015    Remove cataract, insert lens (2) - left eye    COLONOSCOPY      HYSTERECTOMY      OTHER SURGICAL HISTORY  2015    OPTICAL BIOMETRY 53451 (2) - AMARILIS Dubon      SOCIAL HISTORY     Social History     Socioeconomic History    Marital status:    Tobacco Use    Smoking status: Former     Packs/day: 0.50     Years: 25.00     Pack years: 12.50     Types: Cigarettes     Start date: 1990     Quit date: 2017     Years since quittin.6     Passive exposure: Current    Smokeless tobacco: Never   Vaping Use    Vaping Use: Never used   Substance and Sexual Activity    Alcohol use: Never    Drug use: Never    Sexual activity: Not Currently     Partners: Male     Birth control/protection: Condom      ALLERGIES   Patient has no known allergies.   MEDICATIONS     Current Outpatient Medications   Medication Sig Dispense Refill    benazepril (LOTENSIN) 40 MG tablet Take 1 tablet by mouth once daily 90  tablet 3    chlorthalidone (HYGROTON) 25 MG tablet Take 0.5 tablets by mouth Every Morning. 45 tablet 3    FLUoxetine (PROzac) 20 MG capsule Take 1 capsule by mouth Daily. 90 capsule 3    nystatin (MYCOSTATIN) 740095 UNIT/GM powder Apply  topically to the appropriate area as directed 3 (Three) Times a Day. 60 g 11    omeprazole (priLOSEC) 40 MG capsule Take 1 capsule by mouth Daily. 90 capsule 3    simvastatin (ZOCOR) 20 MG tablet TAKE 1 TABLET BY MOUTH ONCE DAILY AT BEDTIME 90 tablet 3    solifenacin (VESIcare) 5 MG tablet Take 1 tablet by mouth Daily. 30 tablet 11     Current Facility-Administered Medications   Medication Dose Route Frequency Provider Last Rate Last Admin    triamcinolone acetonide (KENALOG-40) injection 80 mg  80 mg Intramuscular Once Karley Rodríguez APRN            The following portions of the patient's history were reviewed and updated as appropriate: allergies, current medications, past family history, past medical history, past social history, past surgical history and problem list.        Assessment & Plan   Diagnoses and all orders for this visit:    1. Chronic pain of right knee (Primary)  -     triamcinolone acetonide (KENALOG-40) injection 80 mg    2. Depression, unspecified depression type    Other orders  -     FLUoxetine (PROzac) 20 MG capsule; Take 1 capsule by mouth Daily.  Dispense: 90 capsule; Refill: 3    Follow up as needed.                    No follow-ups on file.                  This document has been electronically signed by Bill Herrera MD on September 26, 2023 13:10 CDT

## 2023-09-26 NOTE — PROGRESS NOTES
Injection  Injection performed in    by Nichole Packer MA. Patient tolerated the procedure well without complications.  09/26/23   Nichole Packer MA